# Patient Record
Sex: FEMALE | Race: WHITE | Employment: OTHER | URBAN - METROPOLITAN AREA
[De-identification: names, ages, dates, MRNs, and addresses within clinical notes are randomized per-mention and may not be internally consistent; named-entity substitution may affect disease eponyms.]

---

## 2020-07-27 ENCOUNTER — HOSPITAL ENCOUNTER (OUTPATIENT)
Dept: GENERAL RADIOLOGY | Age: 55
Discharge: HOME | End: 2020-07-29
Payer: COMMERCIAL

## 2020-07-27 ENCOUNTER — HOSPITAL ENCOUNTER (OUTPATIENT)
Age: 55
Discharge: HOME | End: 2020-07-29
Payer: COMMERCIAL

## 2020-07-27 PROCEDURE — 77073 BONE LENGTH STUDIES: CPT

## 2020-08-13 ENCOUNTER — HOSPITAL ENCOUNTER (OUTPATIENT)
Dept: GENERAL RADIOLOGY | Age: 55
Discharge: HOME | End: 2020-08-15
Payer: COMMERCIAL

## 2020-08-13 ENCOUNTER — HOSPITAL ENCOUNTER (OUTPATIENT)
Age: 55
Discharge: HOME | End: 2020-08-13
Payer: COMMERCIAL

## 2020-08-13 ENCOUNTER — HOSPITAL ENCOUNTER (OUTPATIENT)
Age: 55
Discharge: HOME | End: 2020-08-15
Payer: COMMERCIAL

## 2020-08-13 LAB
ALBUMIN SERPL-MCNC: 4.2 G/DL (ref 3.5–5.2)
ALBUMIN/GLOBULIN RATIO: ABNORMAL (ref 1–2.5)
ALP BLD-CCNC: 99 U/L (ref 35–104)
ALT SERPL-CCNC: 21 U/L (ref 5–33)
ANION GAP SERPL CALCULATED.3IONS-SCNC: 13 MMOL/L (ref 9–17)
AST SERPL-CCNC: 39 U/L
BILIRUB SERPL-MCNC: 0.39 MG/DL (ref 0.3–1.2)
BUN BLDV-MCNC: 7 MG/DL (ref 6–20)
BUN/CREAT BLD: ABNORMAL (ref 9–20)
CALCIUM SERPL-MCNC: 10 MG/DL (ref 8.6–10.4)
CHLORIDE BLD-SCNC: 99 MMOL/L (ref 98–107)
CHOLESTEROL/HDL RATIO: 1.7
CHOLESTEROL: 198 MG/DL
CO2: 22 MMOL/L (ref 20–31)
CREAT SERPL-MCNC: 0.69 MG/DL (ref 0.5–0.9)
GFR AFRICAN AMERICAN: >60 ML/MIN
GFR NON-AFRICAN AMERICAN: >60 ML/MIN
GFR SERPL CREATININE-BSD FRML MDRD: ABNORMAL ML/MIN/{1.73_M2}
GFR SERPL CREATININE-BSD FRML MDRD: ABNORMAL ML/MIN/{1.73_M2}
GLUCOSE BLD-MCNC: 100 MG/DL (ref 70–99)
HDLC SERPL-MCNC: 116 MG/DL
LDL CHOLESTEROL: 69 MG/DL (ref 0–130)
POTASSIUM SERPL-SCNC: 4.8 MMOL/L (ref 3.7–5.3)
SODIUM BLD-SCNC: 134 MMOL/L (ref 135–144)
TOTAL PROTEIN: 7.7 G/DL (ref 6.4–8.3)
TRIGL SERPL-MCNC: 66 MG/DL
VLDLC SERPL CALC-MCNC: NORMAL MG/DL (ref 1–30)

## 2020-08-13 PROCEDURE — 80053 COMPREHEN METABOLIC PANEL: CPT

## 2020-08-13 PROCEDURE — 36415 COLL VENOUS BLD VENIPUNCTURE: CPT

## 2020-08-13 PROCEDURE — 80061 LIPID PANEL: CPT

## 2020-08-13 PROCEDURE — 71046 X-RAY EXAM CHEST 2 VIEWS: CPT

## 2022-01-13 ENCOUNTER — HOSPITAL ENCOUNTER (OUTPATIENT)
Dept: VASCULAR LAB | Age: 57
Discharge: HOME | End: 2022-01-15
Payer: MEDICARE

## 2022-01-13 DIAGNOSIS — I73.9 PERIPHERAL VASCULAR DISEASE, UNSPECIFIED (HCC): ICD-10-CM

## 2022-01-13 PROCEDURE — 93923 UPR/LXTR ART STDY 3+ LVLS: CPT

## 2022-09-26 ENCOUNTER — HOSPITAL ENCOUNTER (INPATIENT)
Age: 57
LOS: 2 days | Discharge: HOME | DRG: 310 | End: 2022-09-28
Attending: EMERGENCY MEDICINE | Admitting: INTERNAL MEDICINE
Payer: MEDICARE

## 2022-09-26 ENCOUNTER — APPOINTMENT (OUTPATIENT)
Dept: GENERAL RADIOLOGY | Age: 57
DRG: 310 | End: 2022-09-26
Payer: MEDICARE

## 2022-09-26 DIAGNOSIS — G47.33 OSA (OBSTRUCTIVE SLEEP APNEA): ICD-10-CM

## 2022-09-26 DIAGNOSIS — I48.91 ATRIAL FIBRILLATION WITH RAPID VENTRICULAR RESPONSE (HCC): Primary | ICD-10-CM

## 2022-09-26 LAB
ABSOLUTE EOS #: 0.04 K/UL (ref 0–0.44)
ABSOLUTE IMMATURE GRANULOCYTE: 0.04 K/UL (ref 0–0.3)
ABSOLUTE LYMPH #: 1.06 K/UL (ref 1.1–3.7)
ABSOLUTE MONO #: 0.88 K/UL (ref 0.1–1.2)
ALBUMIN SERPL-MCNC: 4.3 G/DL (ref 3.5–5.2)
ALBUMIN/GLOBULIN RATIO: 1.2 (ref 1–2.5)
ALP BLD-CCNC: 96 U/L (ref 35–104)
ALT SERPL-CCNC: 14 U/L (ref 5–33)
ANION GAP SERPL CALCULATED.3IONS-SCNC: 17 MMOL/L (ref 9–17)
AST SERPL-CCNC: 26 U/L
BASOPHILS # BLD: 1 % (ref 0–2)
BASOPHILS ABSOLUTE: 0.07 K/UL (ref 0–0.2)
BILIRUB SERPL-MCNC: 0.4 MG/DL (ref 0.3–1.2)
BUN BLDV-MCNC: 18 MG/DL (ref 6–20)
CALCIUM SERPL-MCNC: 9.6 MG/DL (ref 8.6–10.4)
CHLORIDE BLD-SCNC: 96 MMOL/L (ref 98–107)
CO2: 20 MMOL/L (ref 20–31)
CREAT SERPL-MCNC: 0.81 MG/DL (ref 0.5–0.9)
EOSINOPHILS RELATIVE PERCENT: 0 % (ref 1–4)
GFR AFRICAN AMERICAN: >60 ML/MIN
GFR NON-AFRICAN AMERICAN: >60 ML/MIN
GFR SERPL CREATININE-BSD FRML MDRD: ABNORMAL ML/MIN/{1.73_M2}
GLUCOSE BLD-MCNC: 115 MG/DL (ref 70–99)
HCT VFR BLD CALC: 37.6 % (ref 36.3–47.1)
HEMOGLOBIN: 13.2 G/DL (ref 11.9–15.1)
IMMATURE GRANULOCYTES: 0 %
LYMPHOCYTES # BLD: 12 % (ref 24–43)
MAGNESIUM: 1.9 MG/DL (ref 1.6–2.6)
MCH RBC QN AUTO: 33.2 PG (ref 25.2–33.5)
MCHC RBC AUTO-ENTMCNC: 35.1 G/DL (ref 28.4–34.8)
MCV RBC AUTO: 94.7 FL (ref 82.6–102.9)
MONOCYTES # BLD: 10 % (ref 3–12)
NRBC AUTOMATED: 0 PER 100 WBC
PARTIAL THROMBOPLASTIN TIME: 30.3 SEC (ref 20.5–30.5)
PDW BLD-RTO: 16.6 % (ref 11.8–14.4)
PLATELET # BLD: 288 K/UL (ref 138–453)
PMV BLD AUTO: 8.6 FL (ref 8.1–13.5)
POTASSIUM SERPL-SCNC: 4.5 MMOL/L (ref 3.7–5.3)
PRO-BNP: 2168 PG/ML
RBC # BLD: 3.97 M/UL (ref 3.95–5.11)
RBC # BLD: ABNORMAL 10*6/UL
SEG NEUTROPHILS: 77 % (ref 36–65)
SEGMENTED NEUTROPHILS ABSOLUTE COUNT: 7.15 K/UL (ref 1.5–8.1)
SODIUM BLD-SCNC: 133 MMOL/L (ref 135–144)
TOTAL PROTEIN: 7.9 G/DL (ref 6.4–8.3)
TROPONIN, HIGH SENSITIVITY: 21 NG/L (ref 0–14)
TROPONIN, HIGH SENSITIVITY: 25 NG/L (ref 0–14)
TSH SERPL DL<=0.05 MIU/L-ACNC: 1.91 UIU/ML (ref 0.3–5)
WBC # BLD: 9.2 K/UL (ref 3.5–11.3)

## 2022-09-26 PROCEDURE — 83880 ASSAY OF NATRIURETIC PEPTIDE: CPT

## 2022-09-26 PROCEDURE — 6360000002 HC RX W HCPCS: Performed by: STUDENT IN AN ORGANIZED HEALTH CARE EDUCATION/TRAINING PROGRAM

## 2022-09-26 PROCEDURE — 2580000003 HC RX 258: Performed by: STUDENT IN AN ORGANIZED HEALTH CARE EDUCATION/TRAINING PROGRAM

## 2022-09-26 PROCEDURE — 2500000003 HC RX 250 WO HCPCS: Performed by: STUDENT IN AN ORGANIZED HEALTH CARE EDUCATION/TRAINING PROGRAM

## 2022-09-26 PROCEDURE — 99285 EMERGENCY DEPT VISIT HI MDM: CPT

## 2022-09-26 PROCEDURE — 96375 TX/PRO/DX INJ NEW DRUG ADDON: CPT

## 2022-09-26 PROCEDURE — 94761 N-INVAS EAR/PLS OXIMETRY MLT: CPT

## 2022-09-26 PROCEDURE — 36415 COLL VENOUS BLD VENIPUNCTURE: CPT

## 2022-09-26 PROCEDURE — 93005 ELECTROCARDIOGRAM TRACING: CPT | Performed by: INTERNAL MEDICINE

## 2022-09-26 PROCEDURE — 83735 ASSAY OF MAGNESIUM: CPT

## 2022-09-26 PROCEDURE — 85730 THROMBOPLASTIN TIME PARTIAL: CPT

## 2022-09-26 PROCEDURE — 2700000000 HC OXYGEN THERAPY PER DAY

## 2022-09-26 PROCEDURE — 99223 1ST HOSP IP/OBS HIGH 75: CPT | Performed by: INTERNAL MEDICINE

## 2022-09-26 PROCEDURE — 6370000000 HC RX 637 (ALT 250 FOR IP)

## 2022-09-26 PROCEDURE — 94640 AIRWAY INHALATION TREATMENT: CPT

## 2022-09-26 PROCEDURE — 96374 THER/PROPH/DIAG INJ IV PUSH: CPT

## 2022-09-26 PROCEDURE — 80053 COMPREHEN METABOLIC PANEL: CPT

## 2022-09-26 PROCEDURE — 71045 X-RAY EXAM CHEST 1 VIEW: CPT

## 2022-09-26 PROCEDURE — 93005 ELECTROCARDIOGRAM TRACING: CPT | Performed by: STUDENT IN AN ORGANIZED HEALTH CARE EDUCATION/TRAINING PROGRAM

## 2022-09-26 PROCEDURE — 84443 ASSAY THYROID STIM HORMONE: CPT

## 2022-09-26 PROCEDURE — 84484 ASSAY OF TROPONIN QUANT: CPT

## 2022-09-26 PROCEDURE — 2060000000 HC ICU INTERMEDIATE R&B

## 2022-09-26 PROCEDURE — 85025 COMPLETE CBC W/AUTO DIFF WBC: CPT

## 2022-09-26 RX ORDER — ONDANSETRON 4 MG/1
4 TABLET, ORALLY DISINTEGRATING ORAL EVERY 8 HOURS PRN
Status: DISCONTINUED | OUTPATIENT
Start: 2022-09-26 | End: 2022-09-28 | Stop reason: HOSPADM

## 2022-09-26 RX ORDER — SODIUM CHLORIDE 0.9 % (FLUSH) 0.9 %
5-40 SYRINGE (ML) INJECTION PRN
Status: DISCONTINUED | OUTPATIENT
Start: 2022-09-26 | End: 2022-09-28 | Stop reason: HOSPADM

## 2022-09-26 RX ORDER — HEPARIN SODIUM 1000 [USP'U]/ML
30 INJECTION, SOLUTION INTRAVENOUS; SUBCUTANEOUS PRN
Status: DISCONTINUED | OUTPATIENT
Start: 2022-09-26 | End: 2022-09-28

## 2022-09-26 RX ORDER — HEPARIN SODIUM 1000 [USP'U]/ML
60 INJECTION, SOLUTION INTRAVENOUS; SUBCUTANEOUS ONCE
Status: COMPLETED | OUTPATIENT
Start: 2022-09-26 | End: 2022-09-26

## 2022-09-26 RX ORDER — METOPROLOL SUCCINATE 25 MG/1
25 TABLET, EXTENDED RELEASE ORAL DAILY
COMMUNITY

## 2022-09-26 RX ORDER — BACLOFEN 10 MG/1
10 TABLET ORAL 2 TIMES DAILY
COMMUNITY

## 2022-09-26 RX ORDER — ALBUTEROL SULFATE 90 UG/1
2 AEROSOL, METERED RESPIRATORY (INHALATION) EVERY 6 HOURS PRN
Status: DISCONTINUED | OUTPATIENT
Start: 2022-09-26 | End: 2022-09-28 | Stop reason: HOSPADM

## 2022-09-26 RX ORDER — DILTIAZEM HYDROCHLORIDE 5 MG/ML
20 INJECTION INTRAVENOUS ONCE
Status: COMPLETED | OUTPATIENT
Start: 2022-09-26 | End: 2022-09-26

## 2022-09-26 RX ORDER — FUROSEMIDE 40 MG/1
40 TABLET ORAL DAILY
COMMUNITY

## 2022-09-26 RX ORDER — SODIUM CHLORIDE 9 MG/ML
INJECTION, SOLUTION INTRAVENOUS PRN
Status: DISCONTINUED | OUTPATIENT
Start: 2022-09-26 | End: 2022-09-28 | Stop reason: HOSPADM

## 2022-09-26 RX ORDER — BUPROPION HYDROCHLORIDE 150 MG/1
150 TABLET ORAL 2 TIMES DAILY
COMMUNITY

## 2022-09-26 RX ORDER — ACETAMINOPHEN 650 MG/1
650 SUPPOSITORY RECTAL EVERY 6 HOURS PRN
Status: DISCONTINUED | OUTPATIENT
Start: 2022-09-26 | End: 2022-09-28 | Stop reason: HOSPADM

## 2022-09-26 RX ORDER — HEPARIN SODIUM AND DEXTROSE 10000; 5 [USP'U]/100ML; G/100ML
5-30 INJECTION INTRAVENOUS CONTINUOUS
Status: DISCONTINUED | OUTPATIENT
Start: 2022-09-26 | End: 2022-09-28

## 2022-09-26 RX ORDER — ALENDRONATE SODIUM 70 MG/1
70 TABLET ORAL
COMMUNITY

## 2022-09-26 RX ORDER — ALBUTEROL SULFATE 90 UG/1
2 AEROSOL, METERED RESPIRATORY (INHALATION) EVERY 6 HOURS PRN
Status: ON HOLD | COMMUNITY
End: 2022-09-28 | Stop reason: HOSPADM

## 2022-09-26 RX ORDER — POLYETHYLENE GLYCOL 3350 17 G/17G
17 POWDER, FOR SOLUTION ORAL DAILY PRN
Status: DISCONTINUED | OUTPATIENT
Start: 2022-09-26 | End: 2022-09-28 | Stop reason: HOSPADM

## 2022-09-26 RX ORDER — HYDROCODONE BITARTRATE AND ACETAMINOPHEN 5; 325 MG/1; MG/1
1 TABLET ORAL 2 TIMES DAILY
COMMUNITY

## 2022-09-26 RX ORDER — FLUTICASONE FUROATE, UMECLIDINIUM BROMIDE AND VILANTEROL TRIFENATATE 100; 62.5; 25 UG/1; UG/1; UG/1
1 POWDER RESPIRATORY (INHALATION) DAILY
COMMUNITY

## 2022-09-26 RX ORDER — ACETAMINOPHEN 325 MG/1
650 TABLET ORAL EVERY 6 HOURS PRN
Status: DISCONTINUED | OUTPATIENT
Start: 2022-09-26 | End: 2022-09-28 | Stop reason: HOSPADM

## 2022-09-26 RX ORDER — FAMOTIDINE 20 MG/1
20 TABLET, FILM COATED ORAL NIGHTLY
COMMUNITY

## 2022-09-26 RX ORDER — HEPARIN SODIUM 1000 [USP'U]/ML
60 INJECTION, SOLUTION INTRAVENOUS; SUBCUTANEOUS PRN
Status: DISCONTINUED | OUTPATIENT
Start: 2022-09-26 | End: 2022-09-28

## 2022-09-26 RX ORDER — ALBUTEROL SULFATE 90 UG/1
2 AEROSOL, METERED RESPIRATORY (INHALATION) EVERY 4 HOURS PRN
Status: ON HOLD | COMMUNITY
End: 2022-09-28 | Stop reason: HOSPADM

## 2022-09-26 RX ORDER — SODIUM CHLORIDE 0.9 % (FLUSH) 0.9 %
5-40 SYRINGE (ML) INJECTION EVERY 12 HOURS SCHEDULED
Status: DISCONTINUED | OUTPATIENT
Start: 2022-09-26 | End: 2022-09-28 | Stop reason: HOSPADM

## 2022-09-26 RX ORDER — ONDANSETRON 2 MG/ML
4 INJECTION INTRAMUSCULAR; INTRAVENOUS EVERY 6 HOURS PRN
Status: DISCONTINUED | OUTPATIENT
Start: 2022-09-26 | End: 2022-09-28 | Stop reason: HOSPADM

## 2022-09-26 RX ORDER — ONDANSETRON 2 MG/ML
4 INJECTION INTRAMUSCULAR; INTRAVENOUS ONCE
Status: COMPLETED | OUTPATIENT
Start: 2022-09-26 | End: 2022-09-26

## 2022-09-26 RX ADMIN — HEPARIN SODIUM 3970 UNITS: 1000 INJECTION INTRAVENOUS; SUBCUTANEOUS at 17:59

## 2022-09-26 RX ADMIN — DILTIAZEM HYDROCHLORIDE 2.5 MG/HR: 5 INJECTION, SOLUTION INTRAVENOUS at 16:22

## 2022-09-26 RX ADMIN — SODIUM CHLORIDE, PRESERVATIVE FREE 10 ML: 5 INJECTION INTRAVENOUS at 21:19

## 2022-09-26 RX ADMIN — HEPARIN SODIUM 12 UNITS/KG/HR: 5000 INJECTION, SOLUTION INTRAVENOUS; SUBCUTANEOUS at 18:03

## 2022-09-26 RX ADMIN — DILTIAZEM HYDROCHLORIDE 20 MG: 5 INJECTION, SOLUTION INTRAVENOUS at 11:48

## 2022-09-26 RX ADMIN — ONDANSETRON 4 MG: 2 INJECTION INTRAMUSCULAR; INTRAVENOUS at 11:49

## 2022-09-26 RX ADMIN — ALBUTEROL SULFATE 2 PUFF: 90 AEROSOL, METERED RESPIRATORY (INHALATION) at 22:46

## 2022-09-26 ASSESSMENT — PAIN - FUNCTIONAL ASSESSMENT: PAIN_FUNCTIONAL_ASSESSMENT: NONE - DENIES PAIN

## 2022-09-26 ASSESSMENT — LIFESTYLE VARIABLES
HOW OFTEN DO YOU HAVE A DRINK CONTAINING ALCOHOL: 2-3 TIMES A WEEK
HOW MANY STANDARD DRINKS CONTAINING ALCOHOL DO YOU HAVE ON A TYPICAL DAY: 3 OR 4
HOW OFTEN DO YOU HAVE A DRINK CONTAINING ALCOHOL: 4 OR MORE TIMES A WEEK
HOW MANY STANDARD DRINKS CONTAINING ALCOHOL DO YOU HAVE ON A TYPICAL DAY: 3 OR 4

## 2022-09-26 ASSESSMENT — ENCOUNTER SYMPTOMS
ABDOMINAL PAIN: 0
CHEST TIGHTNESS: 0
DIARRHEA: 0
NAUSEA: 0
VOMITING: 0
SHORTNESS OF BREATH: 0

## 2022-09-26 NOTE — ED PROVIDER NOTES
[facility] ED  Emergency Department  Faculty Sign-Out Addendum     Care of Dori Velazquez was assumed from previous attending and is being seen for Chest Pain, Dizziness, Nausea (Without emesis), and Shortness of Breath  . The patient's initial evaluation and plan have been discussed with the prior provider who initially evaluated the patient. Handoff taken on the following patient from prior Attending Physician:    Daphne Kerns    I was available and discussed any additional care issues that arose and coordinated the management plans with the resident(s) caring for the patient during my duty period. Any areas of disagreement with residents documentation of care or procedures are noted on the chart. I was personally present for the key portions of any/all procedures during my duty period. I have documented in the chart those procedures where I was not present during the key portions. EMERGENCY DEPARTMENT COURSE / MEDICAL DECISION MAKING:       MEDICATIONS GIVEN:  Orders Placed This Encounter   Medications    dilTIAZem injection 20 mg    ondansetron (ZOFRAN) injection 4 mg    dilTIAZem 125 mg in dextrose 5 % 125 mL infusion     Order Specific Question:   Titrate Infusion? Answer:   Yes     Order Specific Question:   Initial Infusion Dose: Answer:   5 mg/hr     Order Specific Question:   Goal of Therapy is:      Answer:   HR less than 100 bpm     Order Specific Question:   Contact Provider if:     Answer:   SBP less than 90 mmHg     Order Specific Question:   Contact Provider if:     Answer:   HR less than 60 bpm     Order Specific Question:   HOLD for     Answer:   SBP less than 90 mmHg     Order Specific Question:   HOLD for     Answer:   HR less than 60 bpm       LABS / RADIOLOGY:     Labs Reviewed   COMPREHENSIVE METABOLIC PANEL - Abnormal; Notable for the following components:       Result Value    Glucose 115 (*)     Sodium 133 (*)     Chloride 96 (*)     All other components within normal limits   CBC WITH AUTO DIFFERENTIAL - Abnormal; Notable for the following components:    MCHC 35.1 (*)     RDW 16.6 (*)     Seg Neutrophils 77 (*)     Lymphocytes 12 (*)     Eosinophils % 0 (*)     Absolute Lymph # 1.06 (*)     All other components within normal limits   BRAIN NATRIURETIC PEPTIDE - Abnormal; Notable for the following components:    Pro-BNP 2,168 (*)     All other components within normal limits   TROPONIN - Abnormal; Notable for the following components:    Troponin, High Sensitivity 25 (*)     All other components within normal limits   MAGNESIUM   TSH   TROPONIN       XR CHEST PORTABLE    Result Date: 9/26/2022  EXAMINATION: ONE XRAY VIEW OF THE CHEST 9/26/2022 12:24 pm COMPARISON: 08/13/2020 HISTORY: ORDERING SYSTEM PROVIDED HISTORY: afib rvr TECHNOLOGIST PROVIDED HISTORY: afib rvr FINDINGS: The cardiac silhouette and mediastinal contours are stable. Median sternotomy wires are noted. The lungs are clear. No focal lung infiltrate. No pleural effusion or pneumothorax. The bones are osteopenic. 1. No acute cardiopulmonary disease. RECENT VITALS:     Temp: 97.9 °F (36.6 °C),  Heart Rate: 80, Resp: 16, BP: 100/76, SpO2: 96 %    This patient is a 62 y.o. Female with new onset A. fib with RVR. Given Cardizem with rate control. Admission.     OUTSTANDING TASKS / RECOMMENDATIONS:    Admission      Luis Carlos Webb MD, Paul Osorio  Attending Emergency Physician  [facility] ED       Yen Carrera MD  09/26/22 0326

## 2022-09-26 NOTE — ED PROVIDER NOTES
[address] ED  Emergency Department Encounter  Emergency Medicine Resident     Pt Elvin Archer  MRN: [id number]  [place] 1965  Date of evaluation: 9/26/22  PCP:  Lisbet Biggs DO      CHIEF COMPLAINT       No chief complaint on file. HISTORY OF PRESENT ILLNESS  (Location/Symptom, Timing/Onset, Context/Setting, Quality, Duration, Modifying Factors, Severity.)      Elise Graham is a 62 y.o. female with PMH including CHF, COPD, and HTN who presents from PCP's office for new onset afib RVR with lightheadedness and dizziness which occurred today. She tells me that she has never had an irregular heart rhythm that she knows of. She denies blood thinners aside from a left ankle blood clot that she experienced post op in 2008. She denies chest pains, sob, syncope, N/V/D. Per patient, her PCP offered an ambulance to take her to the ED, but she declined. Her  drove her here. On arrival afib RVR with rate 150-160 bpm.    PAST MEDICAL / SURGICAL / SOCIAL / FAMILY HISTORY      has a past medical history of CHF (congestive heart failure) ([facility] [address] COPD (chronic obstructive pulmonary disease) ([facility] [address] and HTN (hypertension). has a past surgical history that includes Coronary artery bypass graft; Lung surgery (Right); and Mitral valve surgery. Social History     Socioeconomic History    Marital status:      Spouse name: Not on file    Number of children: Not on file    Years of education: Not on file    Highest education level: Not on file   Occupational History    Not on file   Tobacco Use    Smoking status: Every Day     Packs/day: 1.00     Types: Cigarettes    Smokeless tobacco: Never   Substance and Sexual Activity    Alcohol use:  Yes    Drug use: No    Sexual activity: Not on file   Other Topics Concern    Not on file   Social History Narrative    Not on file     Social Determinants of Health     Financial Resource Strain: Not on file   Food Insecurity: Not on file   Transportation Needs: Not on file   Physical Activity: Not on file   Stress: Not on file   Social Connections: Not on file   Intimate Partner Violence: Not on file   Housing Stability: Not on file       Family History   Problem Relation Age of Onset    Heart Disease Mother     Heart Disease Father        Allergies:  Pamprin all day relief max st [naproxen sodium]    Home Medications:  Prior to Admission medications    Medication Sig Start Date End Date Taking? Authorizing Provider   Lina Woodruff 18 MCG inhalation capsule INHALE 1 CAPSULE INTO THE LUNGS DAILY FOR 30 DAYS. 1/25/15   Champ Venegas MD   losartan (COZAAR) 50 MG tablet TAKE 1 TABLET BY MOUTH DAILY FOR 30 DAYS. 12/29/14   Champ Venegas MD   budesonide-formoterol (SYMBICORT) 80-4.5 MCG/ACT AERO Inhale 2 puffs into the lungs 2 times daily. 11/5/14   Champ Venegas MD   albuterol (PROVENTIL) (2.5 MG/3ML) 0.083% nebulizer solution  8/20/14   Historical Provider, MD   albuterol (PROAIR HFA) 108 (90 BASE) MCG/ACT inhaler Inhale 1 puff into the lungs every 4 hours as needed for Wheezing for up to 30 days. 9/4/14 10/4/14  Champ Venegas MD   fluticasone-salmeterol (ADVAIR DISKUS) 250-50 MCG/DOSE AEPB Inhale 1 puff into the lungs every 12 hours for 30 days. 9/4/14 10/4/14  Champ Venegas MD       REVIEW OF SYSTEMS    (2-9 systems for level 4, 10 or more for level 5)      Review of Systems   Constitutional:  Positive for fatigue. Negative for appetite change, chills, diaphoresis and fever. HENT:  Negative for congestion. Eyes:  Negative for visual disturbance. Respiratory:  Negative for shortness of breath and wheezing. Cardiovascular:  Positive for palpitations. Negative for chest pain and leg swelling. Gastrointestinal:  Negative for abdominal pain, diarrhea, nausea and vomiting. Musculoskeletal:  Negative for back pain and myalgias. Skin:  Negative for rash.    Neurological:  Positive for dizziness and light-headedness. Negative for tremors, syncope, speech difficulty and headaches. Psychiatric/Behavioral:  The patient is not nervous/anxious. PHYSICAL EXAM   (up to 7 for level 4, 8 or more for level 5)      INITIAL VITALS:   /76   Pulse 86   Temp 97.9 °F (36.6 °C) (Oral)   Resp 16   Ht 5' 4\" (1.626 m)   SpO2 100%   BMI 25.06 kg/m²     Physical Exam  Constitutional:       General: She is not in acute distress. Appearance: She is not ill-appearing. Comments: Thin female resting in NAD on nasal cannula (not on home O2)   HENT:      Head: Normocephalic and atraumatic. Right Ear: External ear normal.      Left Ear: External ear normal.      Nose: Nose normal. No congestion. Mouth/Throat:      Mouth: Mucous membranes are moist.      Pharynx: Oropharynx is clear. Eyes:      General: No scleral icterus. Extraocular Movements: Extraocular movements intact. Cardiovascular:      Rate and Rhythm: Tachycardia present. Rhythm irregular. Pulses: Normal pulses. Heart sounds: Normal heart sounds. No murmur heard. Pulmonary:      Effort: No respiratory distress. Breath sounds: Normal breath sounds. Abdominal:      General: Abdomen is flat. There is no distension. Palpations: Abdomen is soft. Tenderness: There is no abdominal tenderness. There is no guarding. Musculoskeletal:         General: No deformity or signs of injury. Cervical back: Neck supple. Right lower leg: No edema. Left lower leg: No edema. Skin:     General: Skin is warm and dry. Neurological:      Mental Status: She is alert and oriented to person, place, and time. Mental status is at baseline. Psychiatric:         Mood and Affect: Mood normal.         Behavior: Behavior normal.         Thought Content:  Thought content normal.         Judgment: Judgment normal.       DIFFERENTIAL  DIAGNOSIS     PLAN (LABS / IMAGING / EKG):  Orders Placed This Encounter   Procedures    XR CHEST PORTABLE    CMP    CBC with Auto Differential    Magnesium    TSH    Brain Natriuretic Peptide    Troponin    EKG 12 Lead       MEDICATIONS ORDERED:  Orders Placed This Encounter   Medications    dilTIAZem injection 20 mg    ondansetron (ZOFRAN) injection 4 mg       DDX: afib RVR ***    DIAGNOSTIC RESULTS / EMERGENCY DEPARTMENT COURSE / MDM   LAB RESULTS:  Results for orders placed or performed during the hospital encounter of 09/26/22   CMP   Result Value Ref Range    Glucose 115 (H) 70 - 99 mg/dL    BUN 18 6 - 20 mg/dL    Creatinine 0.81 0.50 - 0.90 mg/dL    Calcium 9.6 8.6 - 10.4 mg/dL    Sodium 133 (L) 135 - 144 mmol/L    Potassium 4.5 3.7 - 5.3 mmol/L    Chloride 96 (L) 98 - 107 mmol/L    CO2 20 20 - 31 mmol/L    Anion Gap 17 9 - 17 mmol/L    Alkaline Phosphatase 96 35 - 104 U/L    ALT 14 5 - 33 U/L    AST 26 <32 U/L    Total Bilirubin 0.4 0.3 - 1.2 mg/dL    Total Protein 7.9 6.4 - 8.3 g/dL    Albumin 4.3 3.5 - 5.2 g/dL    Albumin/Globulin Ratio 1.2 1.0 - 2.5    GFR Non-African American >60 >60 mL/min    GFR African American >60 >60 mL/min    GFR Comment         CBC with Auto Differential   Result Value Ref Range    WBC 9.2 3.5 - 11.3 k/uL    RBC 3.97 3.95 - 5.11 m/uL    Hemoglobin 13.2 11.9 - 15.1 g/dL    Hematocrit 37.6 36.3 - 47.1 %    MCV 94.7 82.6 - 102.9 fL    MCH 33.2 25.2 - 33.5 pg    MCHC 35.1 (H) 28.4 - 34.8 g/dL    RDW 16.6 (H) 11.8 - 14.4 %    Platelets 026 684 - 984 k/uL    MPV 8.6 8.1 - 13.5 fL    NRBC Automated 0.0 0.0 per 100 WBC    Seg Neutrophils 77 (H) 36 - 65 %    Lymphocytes 12 (L) 24 - 43 %    Monocytes 10 3 - 12 %    Eosinophils % 0 (L) 1 - 4 %    Basophils 1 0 - 2 %    Immature Granulocytes 0 0 %    Segs Absolute 7.15 1.50 - 8.10 k/uL    Absolute Lymph # 1.06 (L) 1.10 - 3.70 k/uL    Absolute Mono # 0.88 0.10 - 1.20 k/uL    Absolute Eos # 0.04 0.00 - 0.44 k/uL    Basophils Absolute 0.07 0.00 - 0.20 k/uL    Absolute Immature Granulocyte 0.04 0.00 - 0.30 k/uL    RBC Morphology ANISOCYTOSIS PRESENT    Magnesium   Result Value Ref Range    Magnesium 1.9 1.6 - 2.6 mg/dL   TSH   Result Value Ref Range    TSH 1.91 0.30 - 5.00 uIU/mL   Brain Natriuretic Peptide   Result Value Ref Range    Pro-BNP 2,168 (H) <300 pg/mL   Troponin   Result Value Ref Range    Troponin, High Sensitivity 25 (H) 0 - 14 ng/L       IMPRESSION: CMP with mil hyponatremia, CBC with diff unremarkable, TSH normal, Mg normal, ***    RADIOLOGY:  XR CHEST PORTABLE   Final Result   1. No acute cardiopulmonary disease. EKG  EKG Interpretation    Interpreted by emergency department physician    Rhythm: atrial fibrillation - rapid  Rate: 140-150  Axis: left  Ectopy: none  Conduction: afib  ST Segments: nonspecific changes  T Waves: non specific changes  Q Waves: none    Clinical Impression: atrial fibrillation (new onset) with RVR    Shonna Hall DO      All EKG's are interpreted by the Emergency Department Physician who either signs or Co-signs this chart in the absence of a cardiologist.    EMERGENCY DEPARTMENT COURSE:  ***    ED Course as of 09/26/22 1259   Mon Sep 26, 2022   1257 62 y.o. female with PMH including CHF, COPD, and HTN who presents from PCP's office for new onset afib RVR with lightheadedness and dizziness which occurred today [GC]   1257 Given IV diltiazem bolus 20 mg with resolution of RVR. Now rate in the 70s-80s. Remains stable and non-toxic appearing. Denies active chest pains, sob, headaches. Continues to experience palpitations and dizziness [GC]      ED Course User Index  [GC] Shonna Hall DO       No notes of EC Admission Criteria type on file. PROCEDURES:  ***    CONSULTS:  None    CRITICAL CARE:  ***    FINAL IMPRESSION      No diagnosis found. DISPOSITION / PLAN     DISPOSITION Decision To Admit 09/26/2022 12:11:00 PM      PATIENT REFERRED TO:  No follow-up provider specified.     DISCHARGE MEDICATIONS:  New Prescriptions    No medications on file       Shonna Hall DO  Emergency Medicine Resident    (Please note that portions of thisnote were completed with a voice recognition program.  Efforts were made to edit the dictations but occasionally words are mis-transcribed.)

## 2022-09-26 NOTE — ED TRIAGE NOTES
Pt STS she was at a routine PCP appt prior to arrival.  Pt STS \"the doctor did an EKG and told me I had to go to the ER immediately\". Pt  drove her to ER, Pt reported dizziness with ambulation and \"feeling\" nauseated without emesis. Pt also reported increased SOB, pt is COPD. Pt also reported not being able to rest lately, \"I can't sleep\". Pt reported \"feeling\" like her heart was racing on and off x a week or 2. Pt is alert ox 3 and cooperative.

## 2022-09-26 NOTE — ED NOTES
Report given to RN taking care of pt, no further questions with understanding of pt.       Tejal Resendiz RN  09/26/22 1949

## 2022-09-26 NOTE — H&P
[facility]     Department of Internal Medicine - Staff Internal Medicine Teaching Service          ADMISSION NOTE/HISTORY AND PHYSICAL EXAMINATION   Date: 9/26/2022  Patient Name: Dori Velazquez  Date of admission: 9/26/2022 11:08 AM  YOB: 1965  PCP: Jessica Dwyer DO  History Obtained From:  patient    CHIEF COMPLAINT     Chief complaint: Palpitations    HISTORY OF PRESENTING ILLNESS     The patient is a pleasant 62 y.o. female with past medical history of CHF, COPD, HTN, CABG, mitral valve surgery presented to ED with complaints of fluttery sensation in chest.     Palpation, she is feeling fluttering sensation in the chest for last 2 months along with cold sweats. Lately she has also been dizzy. Today, she went to her PCP for evaluation. On EKG, patient was found to have A. fib RVR with heart rate of 150-160. PCP encouraged the patient to come to ED. Other than last 2 months, patient has never experienced A. fib before. Patient denies chest pain, shortness of breath, headache, blurry vision, nausea, vomiting, abdominal pain or loss of consciousness. Patient uses 2 pillows and a wedge to sleep at night and she can only sleep 4 to 5 hours due to sleep apnea. She was diagnosed with sleep apnea a year ago and was recommended to use CPAP however she could not tolerate the mask and is no longer using it. Review of Systems:  Constitutional:  Positive for fatigue. Negative for appetite change, chills, diaphoresis and fever. HENT:  Negative for congestion. Eyes:  Negative for visual disturbance. Respiratory:  Negative for shortness of breath and wheezing. Cardiovascular:  Positive for palpitations. Negative for chest pain and leg swelling. Gastrointestinal:  Negative for abdominal pain, diarrhea, nausea and vomiting. Musculoskeletal:  Negative for back pain and myalgias. Skin:  Negative for rash.    Neurological:  Positive for dizziness and light-headedness. Negative for tremors, syncope, speech difficulty and headaches. Psychiatric/Behavioral:  The patient is not nervous/anxious. PAST MEDICAL HISTORY     Past Medical History:   Diagnosis Date    CHF (congestive heart failure) (HCC)     COPD (chronic obstructive pulmonary disease) (HCC)     HTN (hypertension)        PAST SURGICAL HISTORY     Past Surgical History:   Procedure Laterality Date    CORONARY ARTERY BYPASS GRAFT      LUNG SURGERY Right     MITRAL VALVE SURGERY         ALLERGIES     Pamprin all day relief max st [naproxen sodium]    MEDICATIONS PRIOR TO ADMISSION     Prior to Admission medications    Medication Sig Start Date End Date Taking? Authorizing Provider   furosemide (LASIX) 40 MG tablet Take 40 mg by mouth daily   Yes Historical Provider, MD   baclofen (LIORESAL) 10 MG tablet Take 10 mg by mouth 2 times daily   Yes Historical Provider, MD   metoprolol succinate (TOPROL XL) 25 MG extended release tablet Take 25 mg by mouth daily   Yes Historical Provider, MD   HYDROcodone-acetaminophen (NORCO) 5-325 MG per tablet Take 1 tablet by mouth in the morning and 1 tablet in the evening. Yes Historical Provider, MD   albuterol sulfate HFA (VENTOLIN HFA) 108 (90 Base) MCG/ACT inhaler Inhale 2 puffs into the lungs every 6 hours as needed for Wheezing   Yes Historical Provider, MD   famotidine (PEPCID) 20 MG tablet Take 20 mg by mouth 2 times daily   Yes Historical Provider, MD   buPROPion (WELLBUTRIN XL) 150 MG extended release tablet Take 150 mg by mouth every morning   Yes Historical Provider, MD Gunjan Martinezser 18 MCG inhalation capsule INHALE 1 CAPSULE INTO THE LUNGS DAILY FOR 30 DAYS. 1/25/15   Perez Kiser MD   losartan (COZAAR) 50 MG tablet TAKE 1 TABLET BY MOUTH DAILY FOR 30 DAYS. 12/29/14   Perez Kiser MD   budesonide-formoterol (SYMBICORT) 80-4.5 MCG/ACT AERO Inhale 2 puffs into the lungs 2 times daily.  11/5/14   Perez Kiser MD   albuterol (PROVENTIL) (2.5 MG/3ML) 0.083% nebulizer solution  14   Historical Provider, MD   albuterol (PROAIR HFA) 108 (90 BASE) MCG/ACT inhaler Inhale 1 puff into the lungs every 4 hours as needed for Wheezing for up to 30 days. Patient taking differently: Inhale 2 puffs into the lungs every 4 hours as needed for Wheezing 9/4/14 10/4/14  Efrain Abraham MD   fluticasone-salmeterol (ADVAIR DISKUS) 250-50 MCG/DOSE AEPB Inhale 1 puff into the lungs every 12 hours for 30 days. 9/4/14 10/4/14  Octavia Red MD       SOCIAL HISTORY     Tobacco: Smoking since the age of 8. Smoked 1 pack/day. Alcohol: 3 drinks at night 3-4 times a week. Illicits: None  Occupation: Past-/cook/manager    FAMILY HISTORY     Family History   Problem Relation Age of Onset    Heart Disease Mother     Heart Disease Father        PHYSICAL EXAM     Vitals: /76   Pulse 80   Temp 97.9 °F (36.6 °C) (Oral)   Resp 16   Ht 5' 4\" (1.626 m)   Wt 146 lb (66.2 kg)   SpO2 96%   BMI 25.06 kg/m²   Tmax: Temp (24hrs), Av.9 °F (36.6 °C), Min:97.9 °F (36.6 °C), Max:97.9 °F (36.6 °C)    Last Body weight:   Wt Readings from Last 3 Encounters:   22 146 lb (66.2 kg)   14 146 lb (66.2 kg)   14 150 lb (68 kg)     Body Mass Index : Body mass index is 25.06 kg/m². PHYSICAL EXAMINATION:  Constitutional:       General: She is not in acute distress. Appearance: She is normal weight. She is not ill-appearing or toxic-appearing. HENT:      Head: Normocephalic and atraumatic. Mouth/Throat:      Mouth: Mucous membranes are moist.      Pharynx: Oropharynx is clear. Eyes:      Extraocular Movements: Extraocular movements intact. Cardiovascular:      Rate and Rhythm: Tachycardia present. Rhythm irregular. Heart sounds: No murmur heard. Pulmonary:      Effort: Pulmonary effort is normal. No respiratory distress. Breath sounds: Normal breath sounds. No stridor.    Abdominal:      General: Bowel sounds are normal. Palpations: Abdomen is soft. Tenderness: There is no abdominal tenderness. Musculoskeletal:      Cervical back: Neck supple. Right lower leg: No edema. Left lower leg: No edema. Skin:     General: Skin is warm and dry. Findings: No erythema. Neurological:      Mental Status: She is alert and oriented to person, place, and time. Psychiatric:         Mood and Affect: Mood normal. Mood is not anxious.          Behavior: Behavior normal.          INVESTIGATIONS     Laboratory Testing:     Recent Results (from the past 24 hour(s))   CMP    Collection Time: 09/26/22 11:50 AM   Result Value Ref Range    Glucose 115 (H) 70 - 99 mg/dL    BUN 18 6 - 20 mg/dL    Creatinine 0.81 0.50 - 0.90 mg/dL    Calcium 9.6 8.6 - 10.4 mg/dL    Sodium 133 (L) 135 - 144 mmol/L    Potassium 4.5 3.7 - 5.3 mmol/L    Chloride 96 (L) 98 - 107 mmol/L    CO2 20 20 - 31 mmol/L    Anion Gap 17 9 - 17 mmol/L    Alkaline Phosphatase 96 35 - 104 U/L    ALT 14 5 - 33 U/L    AST 26 <32 U/L    Total Bilirubin 0.4 0.3 - 1.2 mg/dL    Total Protein 7.9 6.4 - 8.3 g/dL    Albumin 4.3 3.5 - 5.2 g/dL    Albumin/Globulin Ratio 1.2 1.0 - 2.5    GFR Non-African American >60 >60 mL/min    GFR African American >60 >60 mL/min    GFR Comment         CBC with Auto Differential    Collection Time: 09/26/22 11:50 AM   Result Value Ref Range    WBC 9.2 3.5 - 11.3 k/uL    RBC 3.97 3.95 - 5.11 m/uL    Hemoglobin 13.2 11.9 - 15.1 g/dL    Hematocrit 37.6 36.3 - 47.1 %    MCV 94.7 82.6 - 102.9 fL    MCH 33.2 25.2 - 33.5 pg    MCHC 35.1 (H) 28.4 - 34.8 g/dL    RDW 16.6 (H) 11.8 - 14.4 %    Platelets 167 206 - 200 k/uL    MPV 8.6 8.1 - 13.5 fL    NRBC Automated 0.0 0.0 per 100 WBC    Seg Neutrophils 77 (H) 36 - 65 %    Lymphocytes 12 (L) 24 - 43 %    Monocytes 10 3 - 12 %    Eosinophils % 0 (L) 1 - 4 %    Basophils 1 0 - 2 %    Immature Granulocytes 0 0 %    Segs Absolute 7.15 1.50 - 8.10 k/uL    Absolute Lymph # 1.06 (L) 1.10 - 3.70 k/uL    Absolute Mono # 0.88 0.10 - 1.20 k/uL    Absolute Eos # 0.04 0.00 - 0.44 k/uL    Basophils Absolute 0.07 0.00 - 0.20 k/uL    Absolute Immature Granulocyte 0.04 0.00 - 0.30 k/uL    RBC Morphology ANISOCYTOSIS PRESENT    Magnesium    Collection Time: 09/26/22 11:50 AM   Result Value Ref Range    Magnesium 1.9 1.6 - 2.6 mg/dL   TSH    Collection Time: 09/26/22 11:50 AM   Result Value Ref Range    TSH 1.91 0.30 - 5.00 uIU/mL   Brain Natriuretic Peptide    Collection Time: 09/26/22 11:50 AM   Result Value Ref Range    Pro-BNP 2,168 (H) <300 pg/mL   Troponin    Collection Time: 09/26/22 11:50 AM   Result Value Ref Range    Troponin, High Sensitivity 25 (H) 0 - 14 ng/L       Imaging:   XR CHEST PORTABLE    Result Date: 9/26/2022  1. No acute cardiopulmonary disease. ASSESSMENT & PLAN     ASSESSMENT / PLAN:     Principal Problem:    Atrial fibrillation with RVR   -Patient is currently on diltiazem drip with A. Fib, heart rate in 80s. Receiving low-dose heparin. Troponin elevated at 25. We will follow-up on subsequent troponin levels. We will do an echo to rule out any structural abnormality. Cardiology on board, appreciate their recommendations. TSH normal.    COPD  Patient is a current smoker and smokes 1 pack cigarettes a day. Per patient, she uses Trelegy and albuterol inhalers as needed. She does not use home oxygen. CHF  Patient uses 2 pillows and a wedge to sleep at night. Denies shortness of breath. Uses Lasix 40 Mg at home. Sleep apnea  Was diagnosed with apnea a year ago. She was recommended to use CPAP however she could not tolerate it and thus does not use CPAP. Will educate patient on discharge and encouraged to follow-up with a pulmonologist as outpatient. Hypertension  Once acute issue resolves, her home medications will be resumed that includes metoprolol 25 Mg and losartan 50 mg. DVT ppx: Receiving heparin for A. Fib.    GI ppx: Not indicated    PT/OT/SW: Consulted  Discharge Planning: Pending    Gama Wright MD  Internal Medicine Resident, PGY-1  [address], [place]  9/26/2022, 6:23 PM  Attending Physician Statement  I have discussed the care of Dorian Schroeder, including pertinent history and exam findings,  with the resident. I have seen and examined the patient and the key elements of all parts of the encounter have been performed by me. I agree with the assessment, plan and orders as documented by the resident with additions . Seen in ER. Heart rate is better controlled. No heart failure. Treatment plan Discussed with nursing staff in detail , all questions answered . Electronically signed by Bjorn Nugent MD on   9/26/22 at 9:29 PM EDT    Please note that this chart was generated using voice recognition Dragon dictation software. Although every effort was made to ensure the accuracy of this automated transcription, some errors in transcription may have occurred.

## 2022-09-26 NOTE — ED PROVIDER NOTES
on file   Food Insecurity: Not on file   Transportation Needs: Not on file   Physical Activity: Not on file   Stress: Not on file   Social Connections: Not on file   Intimate Partner Violence: Not on file   Housing Stability: Not on file       Family History   Problem Relation Age of Onset    Heart Disease Mother     Heart Disease Father        Allergies:  Pamprin all day relief max st [naproxen sodium]    Home Medications:  Prior to Admission medications    Medication Sig Start Date End Date Taking? Authorizing Provider   furosemide (LASIX) 40 MG tablet Take 40 mg by mouth daily   Yes Historical Provider, MD   baclofen (LIORESAL) 10 MG tablet Take 10 mg by mouth 2 times daily   Yes Historical Provider, MD   metoprolol succinate (TOPROL XL) 25 MG extended release tablet Take 25 mg by mouth daily   Yes Historical Provider, MD   HYDROcodone-acetaminophen (NORCO) 5-325 MG per tablet Take 1 tablet by mouth in the morning and 1 tablet in the evening. Yes Historical Provider, MD   albuterol sulfate HFA (VENTOLIN HFA) 108 (90 Base) MCG/ACT inhaler Inhale 2 puffs into the lungs every 6 hours as needed for Wheezing   Yes Historical Provider, MD   famotidine (PEPCID) 20 MG tablet Take 20 mg by mouth 2 times daily   Yes Historical Provider, MD   buPROPion (WELLBUTRIN XL) 150 MG extended release tablet Take 150 mg by mouth every morning   Yes Historical Provider, MD Ramos Hyeli 18 MCG inhalation capsule INHALE 1 CAPSULE INTO THE LUNGS DAILY FOR 30 DAYS. 1/25/15   Herber Guerrero MD   losartan (COZAAR) 50 MG tablet TAKE 1 TABLET BY MOUTH DAILY FOR 30 DAYS. 12/29/14   Herber Guerrero MD   budesonide-formoterol (SYMBICORT) 80-4.5 MCG/ACT AERO Inhale 2 puffs into the lungs 2 times daily.  11/5/14   Herber Guerrero MD   albuterol (PROVENTIL) (2.5 MG/3ML) 0.083% nebulizer solution  8/20/14   Historical Provider, MD   albuterol (PROAIR HFA) 108 (90 BASE) MCG/ACT inhaler Inhale 1 puff into the lungs every 4 hours as needed for Wheezing for up to 30 days. Patient taking differently: Inhale 2 puffs into the lungs every 4 hours as needed for Wheezing 9/4/14 10/4/14  Efrain Oscar García MD   fluticasone-salmeterol (ADVAIR DISKUS) 250-50 MCG/DOSE AEPB Inhale 1 puff into the lungs every 12 hours for 30 days. 9/4/14 10/4/14  Neyda Manley MD       REVIEW OF SYSTEMS    (2-9 systems for level 4, 10 or more for level 5)      Review of Systems   Constitutional:  Positive for fatigue. Negative for chills, diaphoresis and fever. HENT:  Negative for congestion. Eyes:  Negative for visual disturbance. Respiratory:  Negative for chest tightness and shortness of breath. Cardiovascular:  Positive for palpitations. Negative for chest pain. Gastrointestinal:  Negative for abdominal pain, diarrhea, nausea and vomiting. Musculoskeletal:  Negative for arthralgias and myalgias. Skin:  Negative for wound. Neurological:  Positive for dizziness and light-headedness. Negative for tremors, seizures, syncope, weakness and headaches. Psychiatric/Behavioral:  The patient is not nervous/anxious. PHYSICAL EXAM   (up to 7 for level 4, 8 or more for level 5)      INITIAL VITALS:   /76   Pulse 80   Temp 97.9 °F (36.6 °C) (Oral)   Resp 16   Ht 5' 4\" (1.626 m)   Wt 146 lb (66.2 kg)   SpO2 96%   BMI 25.06 kg/m²     Physical Exam  Constitutional:       General: She is not in acute distress. Appearance: She is normal weight. She is not ill-appearing or toxic-appearing. HENT:      Head: Normocephalic and atraumatic. Mouth/Throat:      Mouth: Mucous membranes are moist.      Pharynx: Oropharynx is clear. Eyes:      Extraocular Movements: Extraocular movements intact. Cardiovascular:      Rate and Rhythm: Tachycardia present. Rhythm irregular. Heart sounds: No murmur heard. Pulmonary:      Effort: Pulmonary effort is normal. No respiratory distress. Breath sounds: Normal breath sounds. No stridor. Abdominal:      General: Bowel sounds are normal.      Palpations: Abdomen is soft. Tenderness: There is no abdominal tenderness. Musculoskeletal:      Cervical back: Neck supple. Right lower leg: No edema. Left lower leg: No edema. Skin:     General: Skin is warm and dry. Findings: No erythema. Neurological:      Mental Status: She is alert and oriented to person, place, and time. Psychiatric:         Mood and Affect: Mood normal. Mood is not anxious. Behavior: Behavior normal.       DIFFERENTIAL  DIAGNOSIS     PLAN (LABS / IMAGING / EKG):  Orders Placed This Encounter   Procedures    XR CHEST PORTABLE    CMP    CBC with Auto Differential    Magnesium    TSH    Brain Natriuretic Peptide    Troponin    Troponin    Troponin    APTT    Inpatient consult to Cardiology    Inpatient consult to Internal Medicine    EKG 12 Lead    ECHO Complete 2D W Doppler W Color    ADMIT TO INPATIENT       MEDICATIONS ORDERED:  Orders Placed This Encounter   Medications    dilTIAZem injection 20 mg    ondansetron (ZOFRAN) injection 4 mg    dilTIAZem 125 mg in dextrose 5 % 125 mL infusion     Order Specific Question:   Titrate Infusion? Answer:   Yes     Order Specific Question:   Initial Infusion Dose: Answer:   5 mg/hr     Order Specific Question:   Goal of Therapy is:      Answer:   HR less than 100 bpm     Order Specific Question:   Contact Provider if:     Answer:   SBP less than 90 mmHg     Order Specific Question:   Contact Provider if:     Answer:   HR less than 60 bpm     Order Specific Question:   HOLD for     Answer:   SBP less than 90 mmHg     Order Specific Question:   HOLD for     Answer:   HR less than 60 bpm    heparin (porcine) injection 3,970 Units    heparin (porcine) injection 3,970 Units    heparin (porcine) injection 1,990 Units    heparin 25,000 units in dextrose 5 % 250 mL infusion (rate based)       DDX: Afib RVR, ACS/NSTEMI    DIAGNOSTIC RESULTS / EMERGENCY DEPARTMENT COURSE / MDM   LAB RESULTS:  Results for orders placed or performed during the hospital encounter of 09/26/22   CMP   Result Value Ref Range    Glucose 115 (H) 70 - 99 mg/dL    BUN 18 6 - 20 mg/dL    Creatinine 0.81 0.50 - 0.90 mg/dL    Calcium 9.6 8.6 - 10.4 mg/dL    Sodium 133 (L) 135 - 144 mmol/L    Potassium 4.5 3.7 - 5.3 mmol/L    Chloride 96 (L) 98 - 107 mmol/L    CO2 20 20 - 31 mmol/L    Anion Gap 17 9 - 17 mmol/L    Alkaline Phosphatase 96 35 - 104 U/L    ALT 14 5 - 33 U/L    AST 26 <32 U/L    Total Bilirubin 0.4 0.3 - 1.2 mg/dL    Total Protein 7.9 6.4 - 8.3 g/dL    Albumin 4.3 3.5 - 5.2 g/dL    Albumin/Globulin Ratio 1.2 1.0 - 2.5    GFR Non-African American >60 >60 mL/min    GFR African American >60 >60 mL/min    GFR Comment         CBC with Auto Differential   Result Value Ref Range    WBC 9.2 3.5 - 11.3 k/uL    RBC 3.97 3.95 - 5.11 m/uL    Hemoglobin 13.2 11.9 - 15.1 g/dL    Hematocrit 37.6 36.3 - 47.1 %    MCV 94.7 82.6 - 102.9 fL    MCH 33.2 25.2 - 33.5 pg    MCHC 35.1 (H) 28.4 - 34.8 g/dL    RDW 16.6 (H) 11.8 - 14.4 %    Platelets 011 206 - 232 k/uL    MPV 8.6 8.1 - 13.5 fL    NRBC Automated 0.0 0.0 per 100 WBC    Seg Neutrophils 77 (H) 36 - 65 %    Lymphocytes 12 (L) 24 - 43 %    Monocytes 10 3 - 12 %    Eosinophils % 0 (L) 1 - 4 %    Basophils 1 0 - 2 %    Immature Granulocytes 0 0 %    Segs Absolute 7.15 1.50 - 8.10 k/uL    Absolute Lymph # 1.06 (L) 1.10 - 3.70 k/uL    Absolute Mono # 0.88 0.10 - 1.20 k/uL    Absolute Eos # 0.04 0.00 - 0.44 k/uL    Basophils Absolute 0.07 0.00 - 0.20 k/uL    Absolute Immature Granulocyte 0.04 0.00 - 0.30 k/uL    RBC Morphology ANISOCYTOSIS PRESENT    Magnesium   Result Value Ref Range    Magnesium 1.9 1.6 - 2.6 mg/dL   TSH   Result Value Ref Range    TSH 1.91 0.30 - 5.00 uIU/mL   Brain Natriuretic Peptide   Result Value Ref Range    Pro-BNP 2,168 (H) <300 pg/mL   Troponin   Result Value Ref Range    Troponin, High Sensitivity 25 (H) 0 - 14 ng/L       IMPRESSION: elevated trop, elevated BNP, TSH WNL, CMP unremarkable, CBC with diff unremarkable    RADIOLOGY:  XR CHEST PORTABLE   Final Result   1. No acute cardiopulmonary disease. EKG  EKG Interpretation    Interpreted by emergency department physician    Rhythm: atrial fibrillation - rapid  Rate: 150-160  Axis: normal  Ectopy: none  Conduction: afib  ST Segments: nonspecific changes  T Waves: non specific changes  Q Waves: none    Clinical Impression: atrial fibrillation (new onset)    Ganga Garcia DO      All EKG's are interpreted by the Emergency Department Physician who either signs or Co-signs this chart in the absence of a cardiologist.    EMERGENCY DEPARTMENT COURSE:      ED Course as of 09/26/22 1629   Mon Sep 26, 2022   1257 62 y.o. female with PMH including CHF, COPD, and HTN who presents from PCP's office for new onset afib RVR with lightheadedness and dizziness which occurred today [GC]   1257 Given IV diltiazem bolus 20 mg with resolution of RVR. Now rate in the 70s-80s. Remains stable and non-toxic appearing. Denies active chest pains, sob, headaches. Continues to experience palpitations and dizziness [GC]   1336 Troponin, High Sensitivity(!): 25  Follow-up troponin pending [GC]   1337 Pro-BNP(!): 2,168 [GC]   1337 CMP with mild hyponatremia and normal renal function. CBC with diff unremarkable. Normal TSH [GC]   1338 Cardiology consulted [YZ]   1199 Cardiology requesting Diltiazem drip, order TTE, and medicine to admit [GC]   1626 Internal medicine to admit patient [GC]      ED Course User Index  [GC] Ganga Garcia DO       No notes of EC Admission Criteria type on file. PROCEDURES:  N/a    CONSULTS:  IP CONSULT TO CARDIOLOGY  IP CONSULT TO INTERNAL MEDICINE    CRITICAL CARE:  N/a    FINAL IMPRESSION      1.  Atrial fibrillation with rapid ventricular response ([facility] [address]          DISPOSITION / PLAN     DISPOSITION Admitted 09/26/2022 04:03:04 PM      PATIENT REFERRED TO:  No follow-up provider specified.     DISCHARGE MEDICATIONS:  New Prescriptions    No medications on file       Shonna Hall DO  Emergency Medicine Resident    (Please note that portions of thisnote were completed with a voice recognition program.  Efforts were made to edit the dictations but occasionally words are mis-transcribed.)       Shonna Hall DO  Resident  09/26/22 [id number]

## 2022-09-26 NOTE — Clinical Note
Discharge Plan[de-identified] Other/[facility])   Telemetry/Cardiac Monitoring Required?: Yes

## 2022-09-26 NOTE — ED PROVIDER NOTES
[facility] [address] [facility]     Emergency Department     Faculty Attestation    I performed a history and physical examination of the patient and discussed management with the resident. I reviewed the residents note and agree with the documented findings and plan of care. Any areas of disagreement are noted on the chart. I was personally present for the key portions of any procedures. I have documented in the chart those procedures where I was not present during the key portions. I have reviewed the emergency nurses triage note. I agree with the chief complaint, past medical history, past surgical history, allergies, medications, social and family history as documented unless otherwise noted below. For Physician Assistant/ Nurse Practitioner cases/documentation I have personally evaluated this patient and have completed at least one if not all key elements of the E/M (history, physical exam, and MDM). Additional findings are as noted. Called to bedside patient with a rapid tachycardic heart rate. Referred from new PCP appointment today where she was found to be in A. fib RVR on EKG and sent to ED. No history of A. fib RVR. Does complain of intermittent lightheadedness and fatigue over the past several months but no syncope. No specific chest pain denies history of stimulant usage no history of thyroid problems. History of COPD does not wear oxygen at home was hypoxic on arrival improved with supplemental oxygen. Patient appears ill but nontoxic speaking in full sentences normal mental status. Lungs clear heart is irregularly irregular tachycardic in the 150s to 160s equal radial pedal pulses abdomen is soft nontender. No calf tenderness asymmetry. Will control with Cardizem, labs chest x-ray EKG repeat as needed, admit for new A. fib as well as hypoxia. EKG interpretation: Atrial Fibrillation with rapid ventricular response, rate of 152. Normal intervals normal axis no acute ST or T changes. Abnormal EKG    Repeat EKG at 1158: Atrial fibrillation with a ventricular rate 88. Normal intervals normal axis there is 1 PVC no acute ST or T changes improved EKG from initial after Cardizem      Critical Care     CRITICAL CARE: There was a high probability of clinically significant/life threatening deterioration in this patient's condition which required my urgent intervention. Total critical care time was 10 minutes. This excludes any time for separately reportable procedures.        Aundrea Garcia MD, Trena Collado  Attending Emergency  Physician           Aundrea Garcia MD  09/26/22 0143

## 2022-09-27 LAB
ABSOLUTE EOS #: 0.06 K/UL (ref 0–0.44)
ABSOLUTE IMMATURE GRANULOCYTE: <0.03 K/UL (ref 0–0.3)
ABSOLUTE LYMPH #: 0.9 K/UL (ref 1.1–3.7)
ABSOLUTE MONO #: 0.57 K/UL (ref 0.1–1.2)
ALBUMIN SERPL-MCNC: 3.4 G/DL (ref 3.5–5.2)
ALBUMIN/GLOBULIN RATIO: 1 (ref 1–2.5)
ALP BLD-CCNC: 77 U/L (ref 35–104)
ALT SERPL-CCNC: 12 U/L (ref 5–33)
ANION GAP SERPL CALCULATED.3IONS-SCNC: 15 MMOL/L (ref 9–17)
AST SERPL-CCNC: 21 U/L
BASOPHILS # BLD: 1 % (ref 0–2)
BASOPHILS ABSOLUTE: 0.05 K/UL (ref 0–0.2)
BILIRUB SERPL-MCNC: 0.4 MG/DL (ref 0.3–1.2)
BUN BLDV-MCNC: 15 MG/DL (ref 6–20)
CALCIUM SERPL-MCNC: 8.8 MG/DL (ref 8.6–10.4)
CHLORIDE BLD-SCNC: 98 MMOL/L (ref 98–107)
CO2: 19 MMOL/L (ref 20–31)
CREAT SERPL-MCNC: 0.63 MG/DL (ref 0.5–0.9)
EKG ATRIAL RATE: 153 BPM
EKG ATRIAL RATE: 88 BPM
EKG Q-T INTERVAL: 288 MS
EKG Q-T INTERVAL: 370 MS
EKG QRS DURATION: 80 MS
EKG QRS DURATION: 82 MS
EKG QTC CALCULATION (BAZETT): 447 MS
EKG QTC CALCULATION (BAZETT): 457 MS
EKG R AXIS: 22 DEGREES
EKG R AXIS: 26 DEGREES
EKG T AXIS: 64 DEGREES
EKG T AXIS: 74 DEGREES
EKG VENTRICULAR RATE: 152 BPM
EKG VENTRICULAR RATE: 88 BPM
EOSINOPHILS RELATIVE PERCENT: 1 % (ref 1–4)
GFR AFRICAN AMERICAN: >60 ML/MIN
GFR NON-AFRICAN AMERICAN: >60 ML/MIN
GFR SERPL CREATININE-BSD FRML MDRD: ABNORMAL ML/MIN/{1.73_M2}
GLUCOSE BLD-MCNC: 97 MG/DL (ref 70–99)
HCT VFR BLD CALC: 33.1 % (ref 36.3–47.1)
HEMOGLOBIN: 11.4 G/DL (ref 11.9–15.1)
IMMATURE GRANULOCYTES: 0 %
LV EF: 55 %
LVEF MODALITY: NORMAL
LYMPHOCYTES # BLD: 19 % (ref 24–43)
MCH RBC QN AUTO: 32.9 PG (ref 25.2–33.5)
MCHC RBC AUTO-ENTMCNC: 34.4 G/DL (ref 28.4–34.8)
MCV RBC AUTO: 95.4 FL (ref 82.6–102.9)
MONOCYTES # BLD: 12 % (ref 3–12)
NRBC AUTOMATED: 0 PER 100 WBC
PARTIAL THROMBOPLASTIN TIME: 34 SEC (ref 20.5–30.5)
PARTIAL THROMBOPLASTIN TIME: 44.7 SEC (ref 20.5–30.5)
PARTIAL THROMBOPLASTIN TIME: 49.4 SEC (ref 20.5–30.5)
PARTIAL THROMBOPLASTIN TIME: >120 SEC (ref 20.5–30.5)
PDW BLD-RTO: 16.7 % (ref 11.8–14.4)
PLATELET # BLD: 191 K/UL (ref 138–453)
PMV BLD AUTO: 9.2 FL (ref 8.1–13.5)
POTASSIUM SERPL-SCNC: 3.8 MMOL/L (ref 3.7–5.3)
RBC # BLD: 3.47 M/UL (ref 3.95–5.11)
RBC # BLD: ABNORMAL 10*6/UL
SEG NEUTROPHILS: 66 % (ref 36–65)
SEGMENTED NEUTROPHILS ABSOLUTE COUNT: 3.06 K/UL (ref 1.5–8.1)
SODIUM BLD-SCNC: 132 MMOL/L (ref 135–144)
TOTAL PROTEIN: 6.7 G/DL (ref 6.4–8.3)
TROPONIN, HIGH SENSITIVITY: 21 NG/L (ref 0–14)
TROPONIN, HIGH SENSITIVITY: 22 NG/L (ref 0–14)
WBC # BLD: 4.7 K/UL (ref 3.5–11.3)

## 2022-09-27 PROCEDURE — 2700000000 HC OXYGEN THERAPY PER DAY

## 2022-09-27 PROCEDURE — 6370000000 HC RX 637 (ALT 250 FOR IP)

## 2022-09-27 PROCEDURE — 93010 ELECTROCARDIOGRAM REPORT: CPT | Performed by: INTERNAL MEDICINE

## 2022-09-27 PROCEDURE — 2060000000 HC ICU INTERMEDIATE R&B

## 2022-09-27 PROCEDURE — 6360000002 HC RX W HCPCS: Performed by: STUDENT IN AN ORGANIZED HEALTH CARE EDUCATION/TRAINING PROGRAM

## 2022-09-27 PROCEDURE — 94761 N-INVAS EAR/PLS OXIMETRY MLT: CPT

## 2022-09-27 PROCEDURE — 93306 TTE W/DOPPLER COMPLETE: CPT

## 2022-09-27 PROCEDURE — 99232 SBSQ HOSP IP/OBS MODERATE 35: CPT | Performed by: INTERNAL MEDICINE

## 2022-09-27 PROCEDURE — 36415 COLL VENOUS BLD VENIPUNCTURE: CPT

## 2022-09-27 PROCEDURE — 94640 AIRWAY INHALATION TREATMENT: CPT

## 2022-09-27 PROCEDURE — 85730 THROMBOPLASTIN TIME PARTIAL: CPT

## 2022-09-27 PROCEDURE — 85025 COMPLETE CBC W/AUTO DIFF WBC: CPT

## 2022-09-27 PROCEDURE — 2580000003 HC RX 258: Performed by: STUDENT IN AN ORGANIZED HEALTH CARE EDUCATION/TRAINING PROGRAM

## 2022-09-27 PROCEDURE — 84484 ASSAY OF TROPONIN QUANT: CPT

## 2022-09-27 PROCEDURE — 80053 COMPREHEN METABOLIC PANEL: CPT

## 2022-09-27 RX ORDER — FLECAINIDE ACETATE 50 MG/1
50 TABLET ORAL EVERY 12 HOURS SCHEDULED
Status: DISCONTINUED | OUTPATIENT
Start: 2022-09-27 | End: 2022-09-28 | Stop reason: HOSPADM

## 2022-09-27 RX ORDER — METOPROLOL SUCCINATE 25 MG/1
25 TABLET, EXTENDED RELEASE ORAL DAILY
Status: DISCONTINUED | OUTPATIENT
Start: 2022-09-27 | End: 2022-09-28 | Stop reason: HOSPADM

## 2022-09-27 RX ORDER — IPRATROPIUM BROMIDE AND ALBUTEROL SULFATE 2.5; .5 MG/3ML; MG/3ML
1 SOLUTION RESPIRATORY (INHALATION)
Status: DISCONTINUED | OUTPATIENT
Start: 2022-09-27 | End: 2022-09-28 | Stop reason: HOSPADM

## 2022-09-27 RX ORDER — BUDESONIDE AND FORMOTEROL FUMARATE DIHYDRATE 80; 4.5 UG/1; UG/1
2 AEROSOL RESPIRATORY (INHALATION) 2 TIMES DAILY
Status: DISCONTINUED | OUTPATIENT
Start: 2022-09-27 | End: 2022-09-28 | Stop reason: HOSPADM

## 2022-09-27 RX ORDER — HYDROCODONE BITARTRATE AND ACETAMINOPHEN 5; 325 MG/1; MG/1
1 TABLET ORAL EVERY 6 HOURS PRN
Status: DISCONTINUED | OUTPATIENT
Start: 2022-09-27 | End: 2022-09-28 | Stop reason: HOSPADM

## 2022-09-27 RX ADMIN — IPRATROPIUM BROMIDE AND ALBUTEROL SULFATE 1 AMPULE: .5; 2.5 SOLUTION RESPIRATORY (INHALATION) at 15:47

## 2022-09-27 RX ADMIN — ALBUTEROL SULFATE 2 PUFF: 90 AEROSOL, METERED RESPIRATORY (INHALATION) at 18:37

## 2022-09-27 RX ADMIN — SODIUM CHLORIDE, PRESERVATIVE FREE 10 ML: 5 INJECTION INTRAVENOUS at 20:30

## 2022-09-27 RX ADMIN — BUDESONIDE AND FORMOTEROL FUMARATE DIHYDRATE 2 PUFF: 80; 4.5 AEROSOL RESPIRATORY (INHALATION) at 08:02

## 2022-09-27 RX ADMIN — BUDESONIDE AND FORMOTEROL FUMARATE DIHYDRATE 2 PUFF: 80; 4.5 AEROSOL RESPIRATORY (INHALATION) at 21:03

## 2022-09-27 RX ADMIN — HEPARIN SODIUM 1990 UNITS: 1000 INJECTION INTRAVENOUS; SUBCUTANEOUS at 03:06

## 2022-09-27 RX ADMIN — IPRATROPIUM BROMIDE AND ALBUTEROL SULFATE 1 AMPULE: .5; 2.5 SOLUTION RESPIRATORY (INHALATION) at 11:30

## 2022-09-27 RX ADMIN — IPRATROPIUM BROMIDE AND ALBUTEROL SULFATE 1 AMPULE: .5; 2.5 SOLUTION RESPIRATORY (INHALATION) at 21:03

## 2022-09-27 RX ADMIN — HYDROCODONE BITARTRATE AND ACETAMINOPHEN 1 TABLET: 5; 325 TABLET ORAL at 06:28

## 2022-09-27 RX ADMIN — HYDROCODONE BITARTRATE AND ACETAMINOPHEN 1 TABLET: 5; 325 TABLET ORAL at 18:58

## 2022-09-27 RX ADMIN — FLECAINIDE ACETATE 50 MG: 50 TABLET ORAL at 20:30

## 2022-09-27 RX ADMIN — IPRATROPIUM BROMIDE AND ALBUTEROL SULFATE 1 AMPULE: .5; 2.5 SOLUTION RESPIRATORY (INHALATION) at 08:02

## 2022-09-27 ASSESSMENT — PAIN SCALES - GENERAL
PAINLEVEL_OUTOF10: 8
PAINLEVEL_OUTOF10: 8
PAINLEVEL_OUTOF10: 5

## 2022-09-27 ASSESSMENT — PAIN DESCRIPTION - LOCATION
LOCATION: BACK
LOCATION: BACK

## 2022-09-27 ASSESSMENT — PAIN DESCRIPTION - DESCRIPTORS: DESCRIPTORS: STABBING

## 2022-09-27 ASSESSMENT — PAIN - FUNCTIONAL ASSESSMENT: PAIN_FUNCTIONAL_ASSESSMENT: ACTIVITIES ARE NOT PREVENTED

## 2022-09-27 ASSESSMENT — PAIN DESCRIPTION - ORIENTATION: ORIENTATION: LEFT;LOWER

## 2022-09-27 NOTE — CONSULTS
[facility]   Consult Note         Today's Date: 9/27/2022  Patient Name: Louise Cardoso  Date of admission: 9/26/2022 11:08 AM  Patient's age: 62 y.o., 1965  Admission Dx: Atrial fibrillation with rapid ventricular response ([facility] [address] [I48.91]  Atrial fibrillation with RVR ([address] [I48.91]    Reason for Consult:  Cardiac evaluation    Requesting Physician: Mackenzie Sebastian MD    REASON FOR CONSULT: Atrial fibrillation with RVR    History Obtained From:  Patient, chart, staff, records    HISTORY OF PRESENT ILLNESS:      Patient, 62years old female, presented to the hospital with A. fib and RVR. Patient went to see her PCP yesterday. In the clinic her EKG was done which was found to have A. fib with RVR. Her PCP encouraged her to come to ED. Patient denies any chest pain, shortness of breath, dizziness, lightheadedness. Patient does reports of having weakness and fatigue. Patient denies any nausea and vomiting. Patient has BASTHEVA, and is noncompliant with her CPAP. Patient underwent a mitral valve surgery in 2008 at [facility] for mitral valve repair. Patient admits to drinking alcohol and smoking cigarettes. Her TSH level was done which was 1.91, her troponin levels are negative. Her BNP is 2000 168. Her chest x-ray is normal.  Her magnesium is 1.9 EKG was done which showed A. fib with RVR along with negative ST or T wave changes. Patient takes Lasix 40 Mg, losartan 50 Mg and Toprol-XL 25 at home. Her chest x-ray is negative for any acute cardiopulmonary process and normal    Past Medical History:   has a past medical history of CHF (congestive heart failure) ([facility] [address] COPD (chronic obstructive pulmonary disease) ([facility] [address] and HTN (hypertension). Past Surgical History:   has a past surgical history that includes Coronary artery bypass graft; Lung surgery (Right); and Mitral valve surgery. Home Medications:    Prior to Admission medications    Medication Sig Start Date End Date Taking? Authorizing Provider   furosemide (LASIX) 40 MG tablet Take 40 mg by mouth daily   Yes Historical Provider, MD   baclofen (LIORESAL) 10 MG tablet Take 10 mg by mouth 2 times daily   Yes Historical Provider, MD   metoprolol succinate (TOPROL XL) 25 MG extended release tablet Take 25 mg by mouth daily   Yes Historical Provider, MD   HYDROcodone-acetaminophen (NORCO) 5-325 MG per tablet Take 1 tablet by mouth in the morning and 1 tablet in the evening. Yes Historical Provider, MD   albuterol sulfate HFA (PROVENTIL;VENTOLIN;PROAIR) 108 (90 Base) MCG/ACT inhaler Inhale 2 puffs into the lungs every 6 hours as needed for Wheezing  Patient not taking: Reported on 9/26/2022   Yes Historical Provider, MD   famotidine (PEPCID) 20 MG tablet Take 20 mg by mouth nightly   Yes Historical Provider, MD   buPROPion (WELLBUTRIN XL) 150 MG extended release tablet Take 150 mg by mouth 2 times daily   Yes Historical Provider, MD   fluticasone-umeclidin-vilant (TRELEGY ELLIPTA) 100-62.5-25 MCG/INH AEPB Inhale 1 puff into the lungs daily   Yes Historical Provider, MD   albuterol sulfate HFA (PROVENTIL;VENTOLIN;PROAIR) 108 (90 Base) MCG/ACT inhaler Inhale 2 puffs into the lungs every 4 hours as needed for Wheezing   Yes Historical Provider, MD   alendronate (FOSAMAX) 70 MG tablet Take 70 mg by mouth every 7 days On empty stomach before breakfast. Remain Upright for 30 mins and take with 8 ounces of water   Yes Historical Provider, MD   vitamin D (CHOLECALCIFEROL) 125 MCG (5000 UT) CAPS capsule Take 5,000 Units by mouth daily   Yes Historical Provider, MD   Ascencion Dye 18 MCG inhalation capsule INHALE 1 CAPSULE INTO THE LUNGS DAILY FOR 30 DAYS. Patient not taking: Reported on 9/26/2022 1/25/15   Sally Munoz MD   losartan (COZAAR) 50 MG tablet TAKE 1 TABLET BY MOUTH DAILY FOR 30 DAYS. 12/29/14   Sally Munoz MD   budesonide-formoterol (SYMBICORT) 80-4.5 MCG/ACT AERO Inhale 2 puffs into the lungs 2 times daily.   Patient not taking: Reported on 9/26/2022 11/5/14   Ella Hernandez MD   albuterol (PROVENTIL) (2.5 MG/3ML) 0.083% nebulizer solution  8/20/14   Historical Provider, MD   albuterol (PROAIR HFA) 108 (90 BASE) MCG/ACT inhaler Inhale 1 puff into the lungs every 4 hours as needed for Wheezing for up to 30 days. Patient taking differently: Inhale 2 puffs into the lungs every 4 hours as needed for Wheezing 9/4/14 10/4/14  Efrain Hutchins MD   fluticasone-salmeterol (ADVAIR DISKUS) 250-50 MCG/DOSE AEPB Inhale 1 puff into the lungs every 12 hours for 30 days. 9/4/14 10/4/14  Ella Hernandez MD       ipratropium-albuterol, 1 ampule, Inhalation, Q4H WA    budesonide-formoterol, 2 puff, Inhalation, BID    metoprolol succinate, 25 mg, Oral, Daily    sodium chloride flush, 5-40 mL, IntraVENous, 2 times per day      Allergies:  Pamprin all day relief max st [naproxen sodium]    Social History:   reports that she has been smoking cigarettes. She has been smoking an average of 1 pack per day. She has never used smokeless tobacco. She reports current alcohol use. She reports that she does not use drugs. Family History: family history includes Heart Disease in her father and mother. No h/o sudden cardiac death. REVIEW OF SYSTEMS:    Constitutional: there has been no unanticipated weight loss. There's been No change in energy level, No change in activity level. Eyes: No visual changes or diplopia. No scleral icterus. ENT: No Headaches, hearing loss or vertigo. No mouth sores or sore throat. Cardiovascular: per HPI  Respiratory: per HPI  Gastrointestinal: No abdominal pain, appetite loss, blood in stools. No change in bowel or bladder habits. Genitourinary: No dysuria, trouble voiding, or hematuria. Musculoskeletal:  No gait disturbance, No weakness or joint complaints. Integumentary: No rash or pruritis. Neurological: No headache, diplopia, change in muscle strength, numbness or tingling.  No change in gait, balance, coordination, mood, affect, memory, mentation, behavior. Psychiatric: No anxiety, or depression. Endocrine: No temperature intolerance. No excessive thirst, fluid intake, or urination. No tremor. Hematologic/Lymphatic: No abnormal bruising or bleeding, blood clots or swollen lymph nodes. Allergic/Immunologic: No nasal congestion or hives. PHYSICAL EXAM:      /73   Pulse 65   Temp 98.4 °F (36.9 °C) (Oral)   Resp 16   Ht 5' 4\" (1.626 m)   Wt 146 lb (66.2 kg)   SpO2 98%   BMI 25.06 kg/m²    Constitutional and General Appearance: alert, cooperative, no distress and appears stated age  HEENT: PERRL, no cervical lymphadenopathy. No masses palpable. Normal oral mucosa  Respiratory:  Normal excursion and expansion without use of accessory muscles  Resp Auscultation: Good respiratory effort. No for increased work of breathing. On auscultation: clear to auscultation bilaterally  Cardiovascular: The apical impulse is not displaced  Heart tones are crisp and normal. regular S1 and S2.  Jugular venous pulsation Normal  The carotid upstroke is normal in amplitude and contour without delay or bruit  Peripheral pulses are symmetrical and full   Abdomen:   No masses or tenderness  Bowel sounds present  Extremities:   No Cyanosis or Clubbing   Lower extremity edema: No   Skin: Warm and dry  Neurological:  Alert and oriented. Moves all extremities well  No abnormalities of mood, affect, memory, mentation, or behavior are noted        EKG:    Date: 09/27/22  Reading:   Ike   No ST or T wave changes. LAST ECHO:  Date: 07/16/2014  Summary  Left ventricle is normal in size. Global left ventricular systolic function  is normal. Estimated ejection fraction is 60 %. Left atrium is mildly dilated. Prosthetic mitral valve is normal in appearance and function. Type: appears  bioprosthetic  Mild mitral regurgitation. No significant pericardial effusion is seen.       LAST Stress Test:   07/16/2014  No significant perfusion abnormalities identified. Specifically, there is no convincing scintigraphic evidence of stress-induced ischemia. No absolute segmental wall motion abnormalities noted. The left ventricular ejection fraction is normal measuring approximately 72 percent. LAST Cardiac Angiography:.  Date:  Findings:      Labs:     CBC:   Recent Labs     09/26/22  1150 09/27/22  0600   WBC 9.2 4.7   HGB 13.2 11.4*   HCT 37.6 33.1*    191     BMP:   Recent Labs     09/26/22  1150 09/27/22  0600   * 132*   K 4.5 3.8   CO2 20 19*   BUN 18 15   CREATININE 0.81 0.63   LABGLOM >60 >60   GLUCOSE 115* 97     BNP: No results for input(s): BNP in the last 72 hours. PT/INR: No results for input(s): PROTIME, INR in the last 72 hours. APTT:  Recent Labs     09/26/22  1757 09/26/22  2344   APTT 30.3 44.7*     CARDIAC ENZYMES:No results for input(s): CKTOTAL, CKMB, CKMBINDEX, TROPONINI in the last 72 hours. FASTING LIPID PANEL:  Lab Results   Component Value Date/Time     08/13/2020 10:41 AM    TRIG 66 08/13/2020 10:41 AM     LIVER PROFILE:  Recent Labs     09/26/22  1150 09/27/22  0600   AST 26 21   ALT 14 12   LABALBU 4.3 3.4*     Troponins: Invalid input(s): TROPONIN     Other Current Problems  Patient Active Problem List   Diagnosis    CHF (congestive heart failure) ([facility])    HTN (hypertension)    COPD (chronic obstructive pulmonary disease) ([facility])    CAD (coronary artery disease)    Smoker    Atrial fibrillation with RVR ([facility])           IMPRESSION:  Paroxysmal Atrial fibrillation now back in NSR. Preserved LV systolic function on last echo 2014  S/P Mitral valve repair  Tobacco abuse  Alcohol abuse  COPD  Hypertension  BATSHEVA          Recommendations:    Continue metoprolol. Continue heparin, band start Coumadin for long term anticoagulation. Follow-up with echo.   Start flecainide 50 Mg twice daily to maintain NSR  [id number] 2 score is 4, start her on warfarin due to valvular A.fib  Keep potassium above 4 and magnesium above 2      Discussed with patient, family, and Nurse. Electronically signed by Paul Fam MD on 9/27/2022 at 7:43 AM  Paul Fam MD  Internal Medicine Resident, PGY-1  [address]  7:43 AM          Attending Physician Statement  I have discussed the case of Deanna Villareal including pertinent history and exam findings with the resident. I have seen and examined the patient and the key elements of the encounter have been performed by me. I agree with the assessment, plan and orders as documented by the resident With changes made to the note.      Electronically signed by Oliver Machado MD on 9/27/2022 at 1:10 PM.    [facility]      [phone]

## 2022-09-27 NOTE — CARE COORDINATION
09/27/22 1106   Service Assessment   Patient Orientation Alert and Oriented;Person;Place;Situation   Cognition Alert   History Provided By Patient   Primary Caregiver Self   Support Systems Spouse/Significant Other   Patient's Healthcare Decision Maker is: Legal Next of Kin   PCP Verified by CM Yes  (Jaquelin Gamez DO)   Last Visit to PCP Within last 3 months   Prior Functional Level Independent in ADLs/IADLs   Current Functional Level Independent in ADLs/IADLs   Can patient return to prior living arrangement Yes   Ability to make needs known: Good   Family able to assist with home care needs: Yes   Would you like for me to discuss the discharge plan with any other family members/significant others, and if so, who? No   Financial Resources Medicare   Social/Functional History   Lives With Spouse   Type of [address]   [address]   [address] bars in [address] Responsibilities Yes   Meal [address] Paying/Finance Responsibility [address] [place] Management Primary   Ambulation Assistance Independent   Transfer Assistance Independent   Active  Yes   Mode of Transportation Car   Occupation On disability   Discharge Planning   Type of [address] Prior To Admission [address]   DME Wheelchair;Walker   DME Ordered?  No   Potential Assistance Purchasing Medications No   Type of Home Care Services None   Patient expects to be discharged to: House   One/Two Story Residence Two story   # of Interior Steps 3   Interior Rails Right   Lift Chair Available No   History of falls? 1   Services At/After Discharge   Transition of Care Consult (CM Consult) Discharge Planning   Services At/After Discharge None   Confirm Follow Up Transport Family   Condition of Participation: Discharge Planning   The Plan for Transition of Care is related to the following treatment goals: Have regular heartbeat   The Patient and/or Patient Representative was provided with a Choice of Provider? Patient   The Patient and/Or Patient Representative agree with the Discharge Plan? Yes   Freedom of Choice list was provided with basic dialogue that supports the patient's individualized plan of care/goals, treatment preferences, and shares the quality data associated with the providers? Yes   Spoke with patient at bedside. Role explained. Plan to return home with spouse, has ride. Address, telephone number, ER contacts and PCP verified.

## 2022-09-27 NOTE — PLAN OF CARE
Problem: Discharge Planning  Goal: Discharge to home or other facility with appropriate resources  9/27/2022 0834 by Dominguez Joya RN  Outcome: Progressing  9/27/2022 0217 by Christy Lopez RN  Outcome: Progressing  Flowsheets (Taken 9/26/2022 2114)  Discharge to home or other facility with appropriate resources:   Identify discharge learning needs (meds, wound care, etc)   Identify barriers to discharge with patient and caregiver   Refer to discharge planning if patient needs post-hospital services based on physician order or complex needs related to functional status, cognitive ability or social support system     Problem: Safety - Adult  Goal: Free from fall injury  9/27/2022 0834 by Dominguez Joya RN  Outcome: Progressing  9/27/2022 0217 by Christy Lopez RN  Outcome: Progressing     Problem: ABCDS Injury Assessment  Goal: Absence of physical injury  9/27/2022 0834 by Dominguez Joya RN  Outcome: Progressing  9/27/2022 0217 by Christy Lopez RN  Outcome: Progressing     Problem: Pain  Goal: Verbalizes/displays adequate comfort level or baseline comfort level  Outcome: Progressing     Problem: Skin/Tissue Integrity  Goal: Absence of new skin breakdown  Description: 1. Monitor for areas of redness and/or skin breakdown  2. Assess vascular access sites hourly  3. Every 4-6 hours minimum:  Change oxygen saturation probe site  4. Every 4-6 hours:  If on nasal continuous positive airway pressure, respiratory therapy assess nares and determine need for appliance change or resting period.   Outcome: Progressing

## 2022-09-27 NOTE — PROGRESS NOTES
[facility]  Internal Medicine Teaching Residency Program  Inpatient Daily Progress Note  ______________________________________________________________________________    Patient: Meme William  YOB: 1965   DKK:[id number]    Acct: [de-identified]     [facility]  Admit date: 9/26/2022  Today's date: 09/27/22  Number of days in the hospital: 1    SUBJECTIVE   Admitting Diagnosis: Atrial fibrillation with RVR ([address]  CC: Fluttering sensation in chest    Pt examined at bedside. Chart & results reviewed. No acute overnight events. Patient is vitally stable, /65, heart rate 60, sinus rhythm. Patient denies any further palpitation sensation, chest pain, shortness of breath, headache, N/V. Stable from cardiac standpoint. No new imaging last 24 hours. Labs reviewed      ROS:  Constitutional:  negative for chills, fevers, sweats  Respiratory:  negative for cough, dyspnea on exertion, hemoptysis, shortness of breath, wheezing  Cardiovascular:  negative for chest pain, chest pressure/discomfort, lower extremity edema, palpitations  Gastrointestinal:  negative for abdominal pain, constipation, diarrhea, nausea, vomiting  Neurological:  negative for dizziness, headache  BRIEF HISTORY     The patient is a pleasant 62 y.o. female with past medical history of CHF, COPD, HTN, CABG, mitral valve surgery presented to ED with complaints of fluttery sensation in chest.      Palpation, she is feeling fluttering sensation in the chest for last 2 months along with cold sweats. Lately she has also been dizzy. Today, she went to her PCP for evaluation. On EKG, patient was found to have A. fib RVR with heart rate of 150-160. PCP encouraged the patient to come to ED. Other than last 2 months, patient has never experienced A. fib before.   Patient denies chest pain, shortness of breath, headache, blurry vision, nausea, vomiting, abdominal pain or loss of consciousness. Patient uses 2 pillows and a wedge to sleep at night and she can only sleep 4 to 5 hours due to sleep apnea. She was diagnosed with sleep apnea a year ago and was recommended to use CPAP however she could not tolerate the mask and is no longer using it. OBJECTIVE     Vital Signs:  /73   Pulse 65   Temp 98.4 °F (36.9 °C) (Oral)   Resp 16   Ht 5' 4\" (1.626 m)   Wt 146 lb (66.2 kg)   SpO2 98%   BMI 25.06 kg/m²     Temp (24hrs), Av.4 °F (36.9 °C), Min:97.9 °F (36.6 °C), Max:98.8 °F (37.1 °C)    No intake/output data recorded. Physical Exam:  Constitutional: This is a well developed, well nourished,  62y.o. year old female who is alert, oriented, cooperative and in no apparent distress. Head:normocephalic and atraumatic. EENT:  PERRLA. No conjunctival injections. Septum was midline, mucosa was without erythema, exudates or cobblestoning. No thrush was noted. Neck: Supple without thyromegaly. No elevated JVP. Trachea was midline. Respiratory: Chest was symmetrical without dullness to percussion. Breath sounds bilaterally were clear to auscultation. There were no wheezes, rhonchi or rales. There is no intercostal retraction or use of accessory muscles. No egophony noted. Cardiovascular: Regular without murmur, clicks, gallops or rubs. Abdomen: Slightly rounded and soft without organomegaly. No rebound, rigidity or guarding was appreciated. Lymphatic: No lymphadenopathy. Musculoskeletal: Normal curvature of the spine. No gross muscle weakness. Extremities:  No lower extremity edema, ulcerations, tenderness, varicosities or erythema. Muscle size, tone and strength are normal.  No involuntary movements are noted. Skin:  Warm and dry. Good color, turgor and pigmentation. No lesions or scars.   No cyanosis or clubbing  Neurological/Psychiatric: The patient's general behavior, level of consciousness, thought content and emotional status is normal. Medications:  Scheduled Medications:    ipratropium-albuterol  1 ampule Inhalation Q4H WA    budesonide-formoterol  2 puff Inhalation BID    metoprolol succinate  25 mg Oral Daily    sodium chloride flush  5-40 mL IntraVENous 2 times per day     Continuous Infusions:    sodium chloride      heparin (PORCINE) Infusion 14 Units/kg/hr (09/27/22 0308)     PRN MedicationsHYDROcodone 5 mg - acetaminophen, 1 tablet, Q6H PRN  sodium chloride flush, 5-40 mL, PRN  sodium chloride, , PRN  ondansetron, 4 mg, Q8H PRN   Or  ondansetron, 4 mg, Q6H PRN  polyethylene glycol, 17 g, Daily PRN  acetaminophen, 650 mg, Q6H PRN   Or  acetaminophen, 650 mg, Q6H PRN  heparin (porcine), 60 Units/kg, PRN  heparin (porcine), 30 Units/kg, PRN  albuterol sulfate HFA, 2 puff, Q6H PRN        Diagnostic Labs:  CBC:   Recent Labs     09/26/22  1150 09/27/22  0600   WBC 9.2 4.7   RBC 3.97 3.47*   HGB 13.2 11.4*   HCT 37.6 33.1*   MCV 94.7 95.4   RDW 16.6* 16.7*    191     BMP:   Recent Labs     09/26/22  1150 09/27/22  0600   * 132*   K 4.5 3.8   CL 96* 98   CO2 20 19*   BUN 18 15   CREATININE 0.81 0.63     BNP: No results for input(s): BNP in the last 72 hours. PT/INR: No results for input(s): PROTIME, INR in the last 72 hours. APTT:   Recent Labs     09/26/22  1757 09/26/22  2344   APTT 30.3 44.7*     CARDIAC ENZYMES: No results for input(s): CKMB, CKMBINDEX, TROPONINI in the last 72 hours. Invalid input(s): CKTOTAL;3  FASTING LIPID PANEL:  Lab Results   Component Value Date    CHOL 198 08/13/2020     08/13/2020    TRIG 66 08/13/2020     LIVER PROFILE:   Recent Labs     09/26/22  1150 09/27/22  0600   AST 26 21   ALT 14 12   BILITOT 0.4 0.4   ALKPHOS 96 77      MICROBIOLOGY: No results found for: CULTURE    Imaging:    XR CHEST PORTABLE    Result Date: 9/26/2022  1. No acute cardiopulmonary disease.        ASSESSMENT & PLAN     ASSESSMENT / PLAN:     Atrial fibrillation with RVR   -Patient is currently on diltiazem drip with A. Fib, heart rate in 80s. Receiving low-dose heparin. Troponin elevated at 25> 21> 21. Will do an echo to rule out any structural abnormality. TSH normal Cardiology on board, appreciate their recommendations. Bud Quintanilla COPD  Patient is a current smoker and smokes 1 pack cigarettes a day. Per patient, she uses Trelegy and albuterol inhalers as needed. She does not use home oxygen. CHF  Patient uses 2 pillows and a wedge to sleep at night. Denies shortness of breath. Uses Lasix 40 Mg at home. Sleep apnea  Was diagnosed with apnea a year ago. She was recommended to use CPAP however she could not tolerate it and thus does not use CPAP. Will educate patient on discharge and encouraged to follow-up with a pulmonologist as outpatient. Hypertension  Once acute issue resolves, her home medications will be resumed that includes metoprolol 25 Mg and losartan 50 mg. DVT ppx: Receiving heparin for A. Fib. GI ppx: Not indicated     PT/OT/SW: Consulted  Discharge Planning: Pending    Rafaela Cantrell MD  Internal Medicine Resident, PGY-1  [address] [place], [place]  9/27/2022, 7:11 AM  Attending Physician Statement  I have discussed the care of Lionel Wagoner, including pertinent history and exam findings,  with the resident. I have seen and examined the patient and the key elements of all parts of the encounter have been performed by me. I agree with the assessment, plan and orders as documented by the resident with additions . Continue present antibiotic therapy. We will get a sleep study as outpatient as outpatient. She probably also has a component of depression and antidepressant may be of help. No fever will check blood cultures    Treatment plan Discussed with nursing staff in detail , all questions answered .    Electronically signed by Ban Jara MD on   9/27/22 at 10:10 AM EDT    Please note that this chart was generated using voice recognition Dragon dictation software. Although every effort was made to ensure the accuracy of this automated transcription, some errors in transcription may have occurred.

## 2022-09-27 NOTE — PROGRESS NOTES
Occupational [address] [employer]  Occupational Therapy Not Seen Note    DATE: 2022    NAME: Yeni Brito  MRN: [id number]   : 1965      Patient not seen this date for Occupational Therapy due to:    Patient independent with ADLs and functional tasks with no acute OT needs. Will defer OT evaluation at this time. Please reorder OT if future needs arise.      Next Scheduled Treatment: N/A    Electronically signed by Rosamaria Acevedo OT on 2022 at 2:15 PM

## 2022-09-28 VITALS
BODY MASS INDEX: 24.92 KG/M2 | RESPIRATION RATE: 15 BRPM | WEIGHT: 146 LBS | HEIGHT: 64 IN | OXYGEN SATURATION: 99 % | TEMPERATURE: 99 F | HEART RATE: 84 BPM | DIASTOLIC BLOOD PRESSURE: 75 MMHG | SYSTOLIC BLOOD PRESSURE: 113 MMHG

## 2022-09-28 LAB
PARTIAL THROMBOPLASTIN TIME: 27.1 SEC (ref 20.5–30.5)
PARTIAL THROMBOPLASTIN TIME: >120 SEC (ref 20.5–30.5)

## 2022-09-28 PROCEDURE — 94761 N-INVAS EAR/PLS OXIMETRY MLT: CPT

## 2022-09-28 PROCEDURE — 6360000002 HC RX W HCPCS: Performed by: STUDENT IN AN ORGANIZED HEALTH CARE EDUCATION/TRAINING PROGRAM

## 2022-09-28 PROCEDURE — 6370000000 HC RX 637 (ALT 250 FOR IP)

## 2022-09-28 PROCEDURE — 94640 AIRWAY INHALATION TREATMENT: CPT

## 2022-09-28 PROCEDURE — 99238 HOSP IP/OBS DSCHRG MGMT 30/<: CPT | Performed by: INTERNAL MEDICINE

## 2022-09-28 PROCEDURE — 85730 THROMBOPLASTIN TIME PARTIAL: CPT

## 2022-09-28 PROCEDURE — 2700000000 HC OXYGEN THERAPY PER DAY

## 2022-09-28 PROCEDURE — 2580000003 HC RX 258: Performed by: STUDENT IN AN ORGANIZED HEALTH CARE EDUCATION/TRAINING PROGRAM

## 2022-09-28 PROCEDURE — 6370000000 HC RX 637 (ALT 250 FOR IP): Performed by: STUDENT IN AN ORGANIZED HEALTH CARE EDUCATION/TRAINING PROGRAM

## 2022-09-28 PROCEDURE — 36415 COLL VENOUS BLD VENIPUNCTURE: CPT

## 2022-09-28 RX ORDER — FLECAINIDE ACETATE 50 MG/1
50 TABLET ORAL EVERY 12 HOURS SCHEDULED
Qty: 60 TABLET | Refills: 3 | Status: SHIPPED | OUTPATIENT
Start: 2022-09-28

## 2022-09-28 RX ADMIN — ALBUTEROL SULFATE 2 PUFF: 90 AEROSOL, METERED RESPIRATORY (INHALATION) at 03:56

## 2022-09-28 RX ADMIN — IPRATROPIUM BROMIDE AND ALBUTEROL SULFATE 1 AMPULE: .5; 2.5 SOLUTION RESPIRATORY (INHALATION) at 07:36

## 2022-09-28 RX ADMIN — IPRATROPIUM BROMIDE AND ALBUTEROL SULFATE 1 AMPULE: .5; 2.5 SOLUTION RESPIRATORY (INHALATION) at 11:48

## 2022-09-28 RX ADMIN — APIXABAN 5 MG: 5 TABLET, FILM COATED ORAL at 14:17

## 2022-09-28 RX ADMIN — BUDESONIDE AND FORMOTEROL FUMARATE DIHYDRATE 2 PUFF: 80; 4.5 AEROSOL RESPIRATORY (INHALATION) at 07:36

## 2022-09-28 RX ADMIN — FLECAINIDE ACETATE 50 MG: 50 TABLET ORAL at 09:10

## 2022-09-28 RX ADMIN — HYDROCODONE BITARTRATE AND ACETAMINOPHEN 1 TABLET: 5; 325 TABLET ORAL at 09:11

## 2022-09-28 RX ADMIN — HEPARIN SODIUM 12 UNITS/KG/HR: 5000 INJECTION, SOLUTION INTRAVENOUS; SUBCUTANEOUS at 03:17

## 2022-09-28 RX ADMIN — METOPROLOL SUCCINATE 25 MG: 25 TABLET, FILM COATED, EXTENDED RELEASE ORAL at 09:10

## 2022-09-28 ASSESSMENT — PAIN SCALES - GENERAL
PAINLEVEL_OUTOF10: 0
PAINLEVEL_OUTOF10: 8
PAINLEVEL_OUTOF10: 8

## 2022-09-28 ASSESSMENT — PAIN DESCRIPTION - DESCRIPTORS
DESCRIPTORS: STABBING
DESCRIPTORS: STABBING

## 2022-09-28 ASSESSMENT — PAIN DESCRIPTION - LOCATION
LOCATION: BACK
LOCATION: BACK

## 2022-09-28 ASSESSMENT — PAIN DESCRIPTION - ORIENTATION
ORIENTATION: LEFT
ORIENTATION: LEFT;LOWER

## 2022-09-28 ASSESSMENT — PAIN - FUNCTIONAL ASSESSMENT: PAIN_FUNCTIONAL_ASSESSMENT: ACTIVITIES ARE NOT PREVENTED

## 2022-09-28 NOTE — PLAN OF CARE
Problem: Discharge Planning  Goal: Discharge to home or other facility with appropriate resources  9/28/2022 1222 by Michaela Sharif RN  Outcome: Completed  9/28/2022 0810 by Michaela Sharif RN  Outcome: Progressing  9/28/2022 0342 by Horacio Casillas RN  Outcome: Progressing  Flowsheets (Taken 9/27/2022 2000)  Discharge to home or other facility with appropriate resources: Identify barriers to discharge with patient and caregiver     Problem: Safety - Adult  Goal: Free from fall injury  9/28/2022 1222 by Michaela Sharif RN  Outcome: Completed  9/28/2022 0810 by Michaela Sharif RN  Outcome: Progressing  9/28/2022 0342 by Horacio Casillas RN  Outcome: Progressing     Problem: ABCDS Injury Assessment  Goal: Absence of physical injury  9/28/2022 1222 by Michaela Sharif RN  Outcome: Completed  9/28/2022 0810 by Michaela Sharif RN  Outcome: Progressing  9/28/2022 0342 by Horacio Casillas RN  Outcome: Progressing     Problem: Respiratory - Adult  Goal: Achieves optimal ventilation and oxygenation  9/28/2022 1222 by Michaela Sharif RN  Outcome: Completed  9/28/2022 0810 by Michaela Sharif RN  Outcome: Progressing  9/28/2022 0737 by Zelda Pagan RCP  Outcome: Progressing     Problem: Pain  Goal: Verbalizes/displays adequate comfort level or baseline comfort level  9/28/2022 1222 by Michaela Sharif RN  Outcome: Completed  9/28/2022 0810 by Michaela Sharif RN  Outcome: Progressing  9/28/2022 0342 by Horacio Casillas RN  Outcome: Progressing     Problem: Skin/Tissue Integrity  Goal: Absence of new skin breakdown  Description: 1. Monitor for areas of redness and/or skin breakdown  2. Assess vascular access sites hourly  3. Every 4-6 hours minimum:  Change oxygen saturation probe site  4. Every 4-6 hours:  If on nasal continuous positive airway pressure, respiratory therapy assess nares and determine need for appliance change or resting period.   9/28/2022 1222 by Michaela hSarif RN  Outcome: Completed  9/28/2022 6902 by Caryl Yepez RN  Outcome: Progressing  9/28/2022 0342 by Abilio Rodriguez RN  Outcome: Progressing     Problem: Chronic Conditions and Co-morbidities  Goal: Patient's chronic conditions and co-morbidity symptoms are monitored and maintained or improved  9/28/2022 1222 by Caryl Yepez RN  Outcome: Completed  9/28/2022 0810 by Caryl Yepez RN  Outcome: Progressing  9/28/2022 0342 by Abilio Rodriguez RN  Outcome: Progressing

## 2022-09-28 NOTE — DISCHARGE INSTRUCTIONS
You were treated for atrial fibrillation with rapid ventricular rate. You have been prescribed blood thinner (Apixaban) and a medication to prevent irregular rhythm (Flecainide). Take apixaban 1 tablet a day, twice daily. Take flecainide 1 tablet a day, twice daily. Blood thinner will make your blood thin. Please watch for any signs of active bleeding including bruising. If you experience any uncontrolled bleeding, similar symptoms of chest discomfort, irregular heart rate, please come to ED or call 911. Please follow-up with cardiologist as outpatient for your ECHO results. Please see your PCP in 1 to 2 weeks for post-hospital admission visit and medication adjustment, if needed.

## 2022-09-28 NOTE — PROGRESS NOTES
[facility]  Internal Medicine Teaching Residency Program  Inpatient Daily Progress Note  ______________________________________________________________________________    Patient: Elda Robles  YOB: 1965   OLV:[id number]    Acct: [de-identified]     Room: 70/8323-42  Admit date: 9/26/2022  Today's date: 09/28/22  Number of days in the hospital: 2    SUBJECTIVE   Admitting Diagnosis: Atrial fibrillation with RVR ([address]  CC: Fluttering sensation in chest    Pt examined at bedside. Chart & results reviewed. No acute overnight events. Patient is vitally stable, /83, heart rate 80 home, sinus rhythm. Patient denies any further palpitation sensation, chest pain, shortness of breath, headache, N/V. Stable from cardiac standpoint. No new imaging last 24 hours. Labs reviewed      ROS:  Constitutional:  negative for chills, fevers, sweats  Respiratory:  negative for cough, dyspnea on exertion, hemoptysis, shortness of breath, wheezing  Cardiovascular:  negative for chest pain, chest pressure/discomfort, lower extremity edema, palpitations  Gastrointestinal:  negative for abdominal pain, constipation, diarrhea, nausea, vomiting  Neurological:  negative for dizziness, headache  BRIEF HISTORY     The patient is a pleasant 62 y.o. female with past medical history of CHF, COPD, HTN, CABG, mitral valve surgery presented to ED with complaints of fluttery sensation in chest.      Palpation, she is feeling fluttering sensation in the chest for last 2 months along with cold sweats. Lately she has also been dizzy. Today, she went to her PCP for evaluation. On EKG, patient was found to have A. fib RVR with heart rate of 150-160. PCP encouraged the patient to come to ED. Other than last 2 months, patient has never experienced A. fib before.   Patient denies chest pain, shortness of breath, headache, blurry vision, nausea, vomiting, abdominal pain or loss of consciousness. Patient uses 2 pillows and a wedge to sleep at night and she can only sleep 4 to 5 hours due to sleep apnea. She was diagnosed with sleep apnea a year ago and was recommended to use CPAP however she could not tolerate the mask and is no longer using it. OBJECTIVE     Vital Signs:  /77   Pulse 79   Temp 98.1 °F (36.7 °C) (Axillary)   Resp 12   Ht 5' 4\" (1.626 m)   Wt 146 lb (66.2 kg)   SpO2 99%   BMI 25.06 kg/m²     Temp (24hrs), Av.4 °F (36.9 °C), Min:97.9 °F (36.6 °C), Max:98.9 °F (37.2 °C)    In: 146.4   Out: -     Physical Exam:  Constitutional: This is a well developed, well nourished,  62y.o. year old female who is alert, oriented, cooperative and in no apparent distress. Head:normocephalic and atraumatic. EENT:  PERRLA. No conjunctival injections. Septum was midline, mucosa was without erythema, exudates or cobblestoning. No thrush was noted. Neck: Supple without thyromegaly. No elevated JVP. Trachea was midline. Respiratory: Chest was symmetrical without dullness to percussion. Breath sounds bilaterally were clear to auscultation. There were no wheezes, rhonchi or rales. There is no intercostal retraction or use of accessory muscles. No egophony noted. Cardiovascular: Regular without murmur, clicks, gallops or rubs. Abdomen: Slightly rounded and soft without organomegaly. No rebound, rigidity or guarding was appreciated. Lymphatic: No lymphadenopathy. Musculoskeletal: Normal curvature of the spine. No gross muscle weakness. Extremities:  No lower extremity edema, ulcerations, tenderness, varicosities or erythema. Muscle size, tone and strength are normal.  No involuntary movements are noted. Skin:  Warm and dry. Good color, turgor and pigmentation. No lesions or scars.   No cyanosis or clubbing  Neurological/Psychiatric: The patient's general behavior, level of consciousness, thought content and emotional status is normal. Medications:  Scheduled Medications:    ipratropium-albuterol  1 ampule Inhalation Q4H WA    budesonide-formoterol  2 puff Inhalation BID    metoprolol succinate  25 mg Oral Daily    flecainide  50 mg Oral 2 times per day    sodium chloride flush  5-40 mL IntraVENous 2 times per day     Continuous Infusions:    sodium chloride      heparin (PORCINE) Infusion Stopped (09/28/22 0644)     PRN MedicationsHYDROcodone 5 mg - acetaminophen, 1 tablet, Q6H PRN  sodium chloride flush, 5-40 mL, PRN  sodium chloride, , PRN  ondansetron, 4 mg, Q8H PRN   Or  ondansetron, 4 mg, Q6H PRN  polyethylene glycol, 17 g, Daily PRN  acetaminophen, 650 mg, Q6H PRN   Or  acetaminophen, 650 mg, Q6H PRN  heparin (porcine), 60 Units/kg, PRN  heparin (porcine), 30 Units/kg, PRN  albuterol sulfate HFA, 2 puff, Q6H PRN      Diagnostic Labs:  CBC:   Recent Labs     09/26/22  1150 09/27/22  0600   WBC 9.2 4.7   RBC 3.97 3.47*   HGB 13.2 11.4*   HCT 37.6 33.1*   MCV 94.7 95.4   RDW 16.6* 16.7*    191     BMP:   Recent Labs     09/26/22  1150 09/27/22  0600   * 132*   K 4.5 3.8   CL 96* 98   CO2 20 19*   BUN 18 15   CREATININE 0.81 0.63     BNP: No results for input(s): BNP in the last 72 hours. PT/INR: No results for input(s): PROTIME, INR in the last 72 hours. APTT:   Recent Labs     09/27/22  1511 09/27/22  2232 09/28/22  0603   APTT >120.0* 34.0* >120.0*     CARDIAC ENZYMES: No results for input(s): CKMB, CKMBINDEX, TROPONINI in the last 72 hours. Invalid input(s): CKTOTAL;3  FASTING LIPID PANEL:  Lab Results   Component Value Date    CHOL 198 08/13/2020     08/13/2020    TRIG 66 08/13/2020     LIVER PROFILE:   Recent Labs     09/26/22  1150 09/27/22  0600   AST 26 21   ALT 14 12   BILITOT 0.4 0.4   ALKPHOS 96 77      MICROBIOLOGY: No results found for: CULTURE    Imaging:    XR CHEST PORTABLE    Result Date: 9/26/2022  1. No acute cardiopulmonary disease.        ASSESSMENT & PLAN     ASSESSMENT / PLAN:     Atrial fibrillation with RVR   -Patient is now in sinus rhythm, with heart rate in 70s and 80s. Patient is discharged on apixaban and Eliquis. She is educated on medication compliance & advised to come back to ED if any sign of major bleeding noticed or similar complaints of chest tightness or palpitation noted. COPD  Patient is a current smoker and smokes 1 pack cigarettes a day. Per patient, she uses Trelegy and albuterol inhalers as needed. She does not use home oxygen. Home inhalers resumed. CHF  Patient uses 2 pillows and a wedge to sleep at night. Denies shortness of breath. Uses Lasix 40 Mg at home. Sleep apnea  Was diagnosed with apnea a year ago. She was recommended to use CPAP however she could not tolerate it and thus does not use CPAP. Will educate patient on discharge and encouraged to follow-up with a pulmonologist as outpatient. Hypertension  Once acute issue resolves, her home medications will be resumed that includes metoprolol 25 Mg and losartan 50 mg. DVT ppx: Received heparin for A. Fib. GI ppx: Not indicated     PT/OT/SW: Consulted  Discharge Planning: Patient will be discharged today to home. Evi Galarza MD  Internal Medicine Resident, PGY-1  [facility]; [place], [place]  9/28/2022, 7:21 AM  Attending Physician Statement  I have discussed the care of Melony Cobb, including pertinent history and exam findings,  with the resident. I have seen and examined the patient and the key elements of all parts of the encounter have been performed by me. I agree with the assessment, plan and orders as documented by the resident with additions . Treatment plan Discussed with nursing staff in detail , all questions answered . Electronically signed by Yoshi Bowman MD on   9/28/22 at 5:55 PM EDT    Please note that this chart was generated using voice recognition Dragon dictation software.   Although every effort was made to ensure the accuracy of this automated transcription, some errors in transcription may have occurred.

## 2022-09-28 NOTE — PLAN OF CARE
Problem: Discharge Planning  Goal: Discharge to home or other facility with appropriate resources  Outcome: Progressing  Flowsheets (Taken 9/27/2022 2000)  Discharge to home or other facility with appropriate resources: Identify barriers to discharge with patient and caregiver     Problem: Safety - Adult  Goal: Free from fall injury  Outcome: Progressing     Problem: ABCDS Injury Assessment  Goal: Absence of physical injury  Outcome: Progressing     Problem: Pain  Goal: Verbalizes/displays adequate comfort level or baseline comfort level  Outcome: Progressing     Problem: Skin/Tissue Integrity  Goal: Absence of new skin breakdown  Description: 1. Monitor for areas of redness and/or skin breakdown  2. Assess vascular access sites hourly  3. Every 4-6 hours minimum:  Change oxygen saturation probe site  4. Every 4-6 hours:  If on nasal continuous positive airway pressure, respiratory therapy assess nares and determine need for appliance change or resting period.   Outcome: Progressing     Problem: Chronic Conditions and Co-morbidities  Goal: Patient's chronic conditions and co-morbidity symptoms are monitored and maintained or improved  Outcome: Progressing

## 2022-09-28 NOTE — PROGRESS NOTES
Physical Therapy        Physical Therapy Cancel Note      DATE: 2022    NAME: Melony Cobb  MRN: [id number]   : 1965      Patient not seen this date for Physical Therapy due to:    Patient independent with functional mobility. Will defer PT evaluation at this time. Please reorder PT if future needs arise. Pt denies needs for PT, report no DME needs with good family support. Nurse notified.       Electronically signed by Gerardo Leos PT on 2022 at 9:20 AM

## 2022-09-28 NOTE — PROGRESS NOTES
[facility]  Progress Note                   Date:   9/28/2022  Patient name: Nathaneil Nix  Date of admission:  9/26/2022 11:08 AM  MRN:   [id number]  YOB: 1965  PCP: Stephane Josue DO    Reason for Admission: Atrial fibrillation with rapid ventricular response ([facility] [I48.91]  Atrial fibrillation with RVR ([facility] [I48.91]    Subjective:       Clinical Changes /Abnormalities: Patient seen and examined in room. She is very anxious as she cannot get a hold of  and wanting to be discharged and follow up outpatient. She denies CP and SOB. No acute CV issues/concerns overnight. Discussed with RN. Review of Systems    Medications:   Scheduled Meds:   ipratropium-albuterol  1 ampule Inhalation Q4H WA    budesonide-formoterol  2 puff Inhalation BID    metoprolol succinate  25 mg Oral Daily    flecainide  50 mg Oral 2 times per day    sodium chloride flush  5-40 mL IntraVENous 2 times per day     Continuous Infusions:   sodium chloride      heparin (PORCINE) Infusion 8 Units/kg/hr (09/28/22 0757)     CBC:   Recent Labs     09/26/22  1150 09/27/22  0600   WBC 9.2 4.7   HGB 13.2 11.4*    191     BMP:    Recent Labs     09/26/22  1150 09/27/22  0600   * 132*   K 4.5 3.8   CL 96* 98   CO2 20 19*   BUN 18 15   CREATININE 0.81 0.63   GLUCOSE 115* 97     Hepatic:  Recent Labs     09/26/22  1150 09/27/22  0600   AST 26 21   ALT 14 12   BILITOT 0.4 0.4   ALKPHOS 96 77     Troponin:   Recent Labs     09/26/22  1350 09/26/22  2344 09/27/22  0600   TROPHS 21* 22* 21*     BNP: No results for input(s): BNP in the last 72 hours. Lipids: No results for input(s): CHOL, HDL in the last 72 hours. Invalid input(s): LDLCALCU  INR: No results for input(s): INR in the last 72 hours.     Objective:   Vitals: /75   Pulse 89   Temp 99 °F (37.2 °C) (Oral)   Resp 17   Ht 5' 4\" (1.626 m)   Wt 146 lb (66.2 kg)   SpO2 98%   BMI 25.06 kg/m²   General appearance: alert and cooperative with exam  HEENT: Head: Normocephalic, no lesions, without obvious abnormality. Neck:no JVD, trachea midline, no adenopathy  Lungs: Clear to auscultation  Heart: Regular rate and rhythm, s1/s2 auscultated, no murmurs  Abdomen: soft, non-tender, bowel sounds active  Extremities: no edema  Neurologic: not done        Assessment / Acute Cardiac Problems:   Paroxysmal Atrial fibrillation now back in NSR. Preserved LV systolic function on last echo 2014  S/P Mitral valve repair  Tobacco abuse  Alcohol abuse  COPD  Hypertension  BATSHEVA    Patient Active Problem List:     CHF (congestive heart failure) (HCC)     HTN (hypertension)     COPD (chronic obstructive pulmonary disease) (HCC)     CAD (coronary artery disease)     Smoker     Atrial fibrillation with RVR ([address]      Plan of Treatment:   Awaiting ECHO read. Patient denies any chest pain or SOB. Afib. Continue heparin gtt., BB and flecainide. Keep potassium above 4 and magnesium above 2. Patient very concerned about  and wanting to leave. If she does leave, recommend Eliquis 5 mg BID on discharge and follow up ECHO as outpatient.      Electronically signed by CHRISSY Sapp CNP on 9/28/2022 at 11:20 [id number] [facility].  [phone]

## 2022-09-28 NOTE — PLAN OF CARE
Problem: Discharge Planning  Goal: Discharge to home or other facility with appropriate resources  9/28/2022 0810 by Haley Fay RN  Outcome: Progressing  9/28/2022 0342 by Frances Roman RN  Outcome: Progressing  Flowsheets (Taken 9/27/2022 2000)  Discharge to home or other facility with appropriate resources: Identify barriers to discharge with patient and caregiver     Problem: Safety - Adult  Goal: Free from fall injury  9/28/2022 0810 by Haley Fay RN  Outcome: Progressing  9/28/2022 0342 by Frances Roman RN  Outcome: Progressing     Problem: ABCDS Injury Assessment  Goal: Absence of physical injury  9/28/2022 0810 by Haley Fay RN  Outcome: Progressing  9/28/2022 0342 by Frances Roman RN  Outcome: Progressing     Problem: Respiratory - Adult  Goal: Achieves optimal ventilation and oxygenation  9/28/2022 0810 by Haley Fay RN  Outcome: Progressing  9/28/2022 0737 by Cortez Malik RCP  Outcome: Progressing     Problem: Pain  Goal: Verbalizes/displays adequate comfort level or baseline comfort level  9/28/2022 0810 by Haley Fay RN  Outcome: Progressing  9/28/2022 0342 by Frances Roman RN  Outcome: Progressing     Problem: Skin/Tissue Integrity  Goal: Absence of new skin breakdown  Description: 1. Monitor for areas of redness and/or skin breakdown  2. Assess vascular access sites hourly  3. Every 4-6 hours minimum:  Change oxygen saturation probe site  4. Every 4-6 hours:  If on nasal continuous positive airway pressure, respiratory therapy assess nares and determine need for appliance change or resting period.   9/28/2022 0810 by Haley Fay RN  Outcome: Progressing  9/28/2022 0342 by Frances Roman RN  Outcome: Progressing     Problem: Chronic Conditions and Co-morbidities  Goal: Patient's chronic conditions and co-morbidity symptoms are monitored and maintained or improved  9/28/2022 0810 by Haley Fay RN  Outcome: Progressing  9/28/2022 0342 by Duarte Pro Nehemias Alexander, RN  Outcome: Progressing

## 2022-09-28 NOTE — CARE COORDINATION
Discharge [facility] Case Management Department  Written by: Ramila Washburn RN    Patient Name: Bridgette Molina  Attending Provider: No att. providers found  Admit Date: 2022 11:08 AM  MRN: [id number]  Account: [de-identified]                     : 1965  Discharge Date: 2022      Disposition: home    Ramila Washburn RN

## 2022-09-28 NOTE — PLAN OF CARE
Echo reviewed:  Summary   Normal LV size and wall thickness. No obvious wall motion abnormality seen. Normal LV systolic function with LVEF >55%. RV appears dilated, normal function. RV systolic pressure 49 mmHg   LA appears mildly dilated. Prosthetic MV in stable position. Mean gradient 6 mmHg   Moderate to severe TR   Normal aortic root dimension. No significant pericardial effusion noted. IVC appears dilated, impaired inspiratory collapse indicating elevated l RA   filling pressure. No further CV work-up at this time  Recommend to continue Fleccainide & BB. Switch Heparin gtt to Eliquis 5mg PO BID. OK for discharge from CV standpoint with OP f/u in clinic in 2-3 weeks.      Messi Pierce, APRN - CNP

## 2022-10-03 NOTE — DISCHARGE SUMMARY
[address]     Department of Internal Medicine - Staff Internal Medicine Teaching Service    INPATIENT DISCHARGE SUMMARY      Patient Identification:  Deanna Villareal is a 62 y.o. female. :  1965  MRN: [id number]     Acct: [de-identified]   PCP: Ting Salinas DO  Admit Date:  2022  Discharge date and time: 2022  4:35 PM   Attending Provider: No att. providers found                                     [facility]:  Principal Problem:    Atrial fibrillation with RVR ([address]  Resolved Problems:    * No resolved hospital problems. *      HOSPITAL STAY     Brief Inpatient course:   Deanna Villareal is a 62 y.o. female who was admitted for the management of Atrial fibrillation with RVR ([address] presented to the emergency department with fluttery sensation in chest.   Per patient, she was  feeling fluttering sensation in the chest for last 2 months along with cold sweats. She went to her PCP for evaluation. On EKG, patient was found to have A. fib RVR with heart rate of 150-160. PCP encouraged the patient to come to ED. In the ED, patient was found to have A. fib with RVR. He was started on diltiazem and low-dose heparin drip. Troponin was also elevated at 25. Cardiology was consulted. Cardiology recommended to continue metoprolol, heparin and long-term anticoagulation at discharge. Flecainide 50 mg twice daily was initiated to maintain NSR. Echo was done inpatient, however, patient was eager to go home as  was alone. Patient agreed to discuss echo result as an outpatient and follow-up with cardiology. Patient was discharged on apixaban and flecainide. Echo summary include normal LV size and wall thickness. No obvious wall motion abnormality seen. LVEF more than 55%. RV appears dilated, normal function. RV systolic pressure 49 mmHg. LA appears mildly dilated. Prosthetic MV in stable position.  Mean gradient 6 mmHg   Moderate to severe TR . Normal aortic root dimension. No significant pericardial effusion noted. IVC appears dilated, impaired inspiratory collapse indicating elevated l RA   filling pressure. No further CV work-up at this time  Recommend to continue Fleccainide & BB & Eliquis 5mg PO BID. OK for discharge from CV standpoint with OP f/u in clinic in 2-3 weeks    Procedures/ Significant Interventions:    None     Consults:     Consults:     Final Specialist Recommendations/Findings:   IP CONSULT TO CARDIOLOGY  IP CONSULT TO INTERNAL MEDICINE  IP CONSULT TO CASE MANAGEMENT      Any Hospital Acquired Infections: none    Discharge Functional Status:  stable    DISCHARGE PLAN     Disposition: home    Patient Instructions:   Discharge Medication List as of 9/28/2022  2:10 PM        START taking these medications    Details   flecainide (TAMBOCOR) 50 MG tablet Take 1 tablet by mouth every 12 hours, Disp-60 tablet, R-3Normal      apixaban (ELIQUIS) 5 MG TABS tablet Take 1 tablet by mouth 2 times daily, Disp-60 tablet, R-0Normal           CONTINUE these medications which have NOT CHANGED    Details   furosemide (LASIX) 40 MG tablet Take 40 mg by mouth dailyHistorical Med      baclofen (LIORESAL) 10 MG tablet Take 10 mg by mouth 2 times dailyHistorical Med      metoprolol succinate (TOPROL XL) 25 MG extended release tablet Take 25 mg by mouth dailyHistorical Med      HYDROcodone-acetaminophen (NORCO) 5-325 MG per tablet Take 1 tablet by mouth in the morning and 1 tablet in the evening. Historical Med      famotidine (PEPCID) 20 MG tablet Take 20 mg by mouth nightlyHistorical Med      buPROPion (WELLBUTRIN XL) 150 MG extended release tablet Take 150 mg by mouth 2 times dailyHistorical Med      fluticasone-umeclidin-vilant (TRELEGY ELLIPTA) 100-62.5-25 MCG/INH AEPB Inhale 1 puff into the lungs dailyHistorical Med      alendronate (FOSAMAX) 70 MG tablet Take 70 mg by mouth every 7 days On empty stomach before breakfast. Remain Upright for 30 mins and take with 8 ounces of waterHistorical Med      vitamin D (CHOLECALCIFEROL) 125 MCG (5000 UT) CAPS capsule Take 5,000 Units by mouth dailyHistorical Med      losartan (COZAAR) 50 MG tablet TAKE 1 TABLET BY MOUTH DAILY FOR 30 DAYS., Disp-30 tablet,R-3PATIENT NEEDS A  REFILL ON THIS MEDICATIONNormal      albuterol (PROAIR HFA) 108 (90 BASE) MCG/ACT inhaler Inhale 1 puff into the lungs every 4 hours as needed for Wheezing for up to 30 days. , Disp-2 Inhaler, R-6           STOP taking these medications       albuterol sulfate HFA (PROVENTIL;VENTOLIN;PROAIR) 108 (90 Base) MCG/ACT inhaler Comments:   Reason for Stopping:         albuterol sulfate HFA (PROVENTIL;VENTOLIN;PROAIR) 108 (90 Base) MCG/ACT inhaler Comments:   Reason for Stopping:         Shawn Brunt 18 MCG inhalation capsule Comments:   Reason for Stopping:         budesonide-formoterol (SYMBICORT) 80-4.5 MCG/ACT AERO Comments:   Reason for Stopping:         albuterol (PROVENTIL) (2.5 MG/3ML) 0.083% nebulizer solution Comments:   Reason for Stopping:         fluticasone-salmeterol (ADVAIR DISKUS) 250-50 MCG/DOSE AEPB Comments:   Reason for Stopping:               Activity: activity as tolerated    Diet: regular diet    [place]  [address] [place] [address]    Schedule an appointment as soon as possible for a visit in 1 week(s)  For follow up    [facility]  [address] [place] [address]. [address] [place] [address] [address]  Schedule an appointment as soon as possible for a visit in 2 week(s)  for hospital follow-up in 2-3 weeks. Patient Instructions: You were treated for atrial fibrillation with rapid ventricular rate. You have been prescribed blood thinner (Apixaban) and a medication to prevent irregular rhythm (Flecainide). Take apixaban 1 tablet a day, twice daily. Take flecainide 1 tablet a day, twice daily. Blood thinner will make your blood thin.   Please watch for any signs of active bleeding including bruising. If you experience any uncontrolled bleeding, similar symptoms of chest discomfort, irregular heart rate, please come to ED or call 911. Please follow-up with cardiologist in 2 to 3 weeks as outpatient for your ECHO results. Please see your PCP in 1 to 2 weeks for post-hospital admission visit and medication adjustment, if needed. Jazmín Bahena MD, MD  Internal Medicine Resident, PGY-1  [facility];  [place], [place]  10/3/2022, 3:44 PM

## 2023-01-10 ENCOUNTER — HOSPITAL ENCOUNTER (OUTPATIENT)
Dept: DATA CONVERSION | Facility: HOSPITAL | Age: 58
Discharge: HOME | End: 2023-01-10
Attending: INTERNAL MEDICINE | Admitting: INTERNAL MEDICINE

## 2023-01-10 DIAGNOSIS — I48.0 PAROXYSMAL ATRIAL FIBRILLATION (MULTI): ICD-10-CM

## 2023-01-10 DIAGNOSIS — I25.10 ATHEROSCLEROTIC HEART DISEASE OF NATIVE CORONARY ARTERY WITHOUT ANGINA PECTORIS: ICD-10-CM

## 2023-01-10 DIAGNOSIS — Z53.8 PROCEDURE AND TREATMENT NOT CARRIED OUT FOR OTHER REASONS: ICD-10-CM

## 2023-02-28 LAB
ATRIAL RATE: 61 BPM
P AXIS: 39 DEGREES
P OFFSET: 197 MS
P ONSET: 123 MS
PR INTERVAL: 180 MS
Q ONSET: 213 MS
QRS COUNT: 10 BEATS
QRS DURATION: 88 MS
QT INTERVAL: 440 MS
QTC CALCULATION(BAZETT): 442 MS
QTC FREDERICIA: 442 MS
R AXIS: 20 DEGREES
T AXIS: 69 DEGREES
T OFFSET: 433 MS
VENTRICULAR RATE: 61 BPM